# Patient Record
Sex: FEMALE | Race: WHITE | NOT HISPANIC OR LATINO | Employment: OTHER | URBAN - NONMETROPOLITAN AREA
[De-identification: names, ages, dates, MRNs, and addresses within clinical notes are randomized per-mention and may not be internally consistent; named-entity substitution may affect disease eponyms.]

---

## 2019-01-07 ENCOUNTER — OFFICE VISIT (OUTPATIENT)
Dept: ORTHOPEDIC SURGERY | Facility: CLINIC | Age: 64
End: 2019-01-07

## 2019-01-07 VITALS — WEIGHT: 153 LBS | BODY MASS INDEX: 27.11 KG/M2 | HEIGHT: 63 IN | TEMPERATURE: 97.8 F

## 2019-01-07 DIAGNOSIS — S42.211A CLOSED DISPLACED FRACTURE OF SURGICAL NECK OF RIGHT HUMERUS, UNSPECIFIED FRACTURE MORPHOLOGY, INITIAL ENCOUNTER: Primary | ICD-10-CM

## 2019-01-07 PROCEDURE — 99203 OFFICE O/P NEW LOW 30 MIN: CPT | Performed by: ORTHOPAEDIC SURGERY

## 2019-01-07 RX ORDER — FERROUS SULFATE 325(65) MG
TABLET ORAL
Refills: 0 | COMMUNITY
Start: 2018-10-01

## 2019-01-07 RX ORDER — OXYCODONE HYDROCHLORIDE AND ACETAMINOPHEN 5; 325 MG/1; MG/1
1 TABLET ORAL EVERY 6 HOURS PRN
Qty: 40 TABLET | Refills: 0 | Status: SHIPPED | OUTPATIENT
Start: 2019-01-07 | End: 2019-01-11 | Stop reason: HOSPADM

## 2019-01-07 RX ORDER — PANTOPRAZOLE SODIUM 40 MG/1
40 TABLET, DELAYED RELEASE ORAL DAILY
Refills: 5 | Status: ON HOLD | COMMUNITY
Start: 2018-12-18 | End: 2022-12-07

## 2019-01-07 RX ORDER — LISINOPRIL 10 MG/1
10 TABLET ORAL DAILY
Refills: 5 | COMMUNITY
Start: 2018-12-18

## 2019-01-07 RX ORDER — HYDROCODONE BITARTRATE AND ACETAMINOPHEN 5; 325 MG/1; MG/1
TABLET ORAL
Refills: 0 | Status: ON HOLD | COMMUNITY
Start: 2019-01-05 | End: 2019-01-10

## 2019-01-07 NOTE — PROGRESS NOTES
Chief Complaint   Patient presents with   • Right Shoulder - Pain       HPI  63 year old female presents with a right shoulder injury following a fall sustained on 01/05/19.  She was seen at [facility] and x-rays were obtained.  And fell when she was walking down a ramp.  She did not notice that it was icy and landed awkwardly on her right shoulder.  She had immediate pain with swelling and deformity.  She has abnormal mobility at the fracture site.  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  She was seen in the emergency room and diagnosed with a proximal humerus fracture.  Patient was placed in a sling and sent to our office for further management.  She has stayed neurovascularly intact since the time of the injury.  She is thinking about surgical versus nonsurgical care of this proximal humerus fracture.  She would like to get back to work ASAP.  She states that the fracture fragments are moving at the site of the injury.  She has been applying heat to this area which is somewhat helpful for managing her symptoms.          Allergies   Allergen Reactions   • Morphine Nausea And Vomiting         Social History     Socioeconomic History   • Marital status:      Spouse name: Not on file   • Number of children: Not on file   • Years of education: Not on file   • Highest education level: Not on file   Social Needs   • Financial resource strain: Not on file   • Food insecurity - worry: Not on file   • Food insecurity - inability: Not on file   • Transportation needs - medical: Not on file   • Transportation needs - non-medical: Not on file   Occupational History   • Not on file   Tobacco Use   • Smoking status: Never Smoker   Substance and Sexual Activity   • Alcohol use: No     Frequency: Never   • Drug use: Not on file   • Sexual activity: Not on file   Other Topics Concern   • Not on file   Social History Narrative   • Not on file       History reviewed. No pertinent family history.    No past  surgical history on file.    History reviewed. No pertinent past medical history.        Vitals:    01/07/19 1307   Temp: 97.8 °F (36.6 °C)             Review of Systems   Constitutional: Negative.    HENT: Negative.    Eyes: Negative.    Respiratory: Negative.    Cardiovascular: Negative.    Gastrointestinal: Negative.    Endocrine: Negative.    Genitourinary: Negative.    Musculoskeletal: Positive for joint swelling.   Skin: Negative.    Allergic/Immunologic: Negative.    Neurological: Negative.    Hematological: Negative.    Psychiatric/Behavioral: Negative.            Physical Exam   Constitutional: She is oriented to person, place, and time. She appears well-nourished.   HENT:   Head: Atraumatic.   Eyes: EOM are normal.   Neck: Neck supple.   Cardiovascular: Normal heart sounds and intact distal pulses.   Pulmonary/Chest: Breath sounds normal.   Abdominal: Bowel sounds are normal.   Musculoskeletal: She exhibits edema and tenderness.   Neurological: She is alert and oriented to person, place, and time.   Skin: Skin is warm. Capillary refill takes 2 to 3 seconds.   Psychiatric: She has a normal mood and affect. Her behavior is normal. Judgment and thought content normal.   Nursing note and vitals reviewed.              Joint/Body Part Specific Exam:right shoulder. The patient has extreme amounts of tenderness over the greater tuberosity of the humerus. There is some tenderness over the lesser tuberosity as well. External rotation is extremely limited and painful. Rotator cuff function is inhibited because of pain and tenderness at the insertion of the rotator cuff on the greater tuberosity of the humerus. Axillary nerve function is well preserved. Biceps function is intact. Radial artey pulses are palpable. Apprehension sign is negative. Both active and passive ranges of motion are extremely limited and painful for the patient. There is no clinical deformity. There does appear to be some abnormal mobility at the  fracture site. The pain level is 6.            X-RAY Report:  X-rays obtained from the emergency room are available today in the office.  There is a comminuted proximal humerus fracture involving the metaphysis.  The capital fragment appears to be intact.  There are comminuted butterfly fragments present medially and laterally as well.  The decision for surgical intervention is based on the findings of these images and correlation with patient's clinical examination.          Diagnostics:            Drea was seen today for pain.    Diagnoses and all orders for this visit:    Closed displaced fracture of surgical neck of right humerus, unspecified fracture morphology, initial encounter    Other orders  -     oxyCODONE-acetaminophen (PERCOCET) 5-325 MG per tablet; Take 1 tablet by mouth Every 6 (Six) Hours As Needed (prn for pain).            Procedures          I provided this patient with educational materials regarding bone health and cast or splint care.        Plan: Application of a supportive Jacobs cast brace to maintain the alignment of the fracture fragments    Tablet Percocet 5/325 milligrams tab 1 by mouth every 6 for pain swelling and discomfort.    Tablet ibuprofen 600 mg orally twice a day for pain and discomfort.    Use a sling on the upper extremity.    This is a proximal diaphyseal humerus fracture and is most likely going to need open reduction internal fixation.    I will discuss this with my partner Dr. WOODSON who does a lot more of these fractures than I do and refer the patient to Dr. WOODSON for ORIF in the next few days.    Risks and benefits of surgical intervention discussed with the patient and her son at length and she understands and accepts that.    She is going to think about surgical versus nonsurgical care and then proceed with making that decision after she sees Dr. WOODSON.    Controlled substance treatment options discussed in detail. Patient's signed consent to medical options on file.  BLANCA form in chart.  The patient is being provided this narcotic prescription to address the acute medical condition that they are undergoing/experiencing at this time.  It is my medical and surgical assessment that more than 3 days of narcotic medication is necessary to help control the pain and discomfort that this patient is experiencing at this point.  Risks of narcotic medication usage outlined.  Possibility of physical and psychological dependence and abuse, especially long term, emphasized to the patient.  I have explained to the patient that we will try to wean them off the narcotic medication as soon as possible and introduce non-narcotic modalities of pain control.  At this point in the patient's clinical spectrum, however, alternative available treatments are inadequate to control their pain and symptoms.  I have also discussed the possibility of random drug testing as well as pill counts to prevent misuse and misappropriation of the narcotic medications prescribed to the patient.          CC To Lilia Turner APRN

## 2019-01-08 ENCOUNTER — TELEPHONE (OUTPATIENT)
Dept: ORTHOPEDIC SURGERY | Facility: CLINIC | Age: 64
End: 2019-01-08

## 2019-01-08 PROBLEM — S42.211A CLOSED DISPLACED FRACTURE OF SURGICAL NECK OF RIGHT HUMERUS: Status: ACTIVE | Noted: 2019-01-08

## 2019-01-08 NOTE — TELEPHONE ENCOUNTER
PATIENT CALLED IN TO CHECK ON THE STATUS OF SCHEDULING HER SURGERY. I CHECKED WITH DR. KEARNEY AND HE SAID THAT HE AND DR IVAN HAVE TALKED AND THAT DR IVAN IS GOING TO DO THIS CASE AND ASKED THAT I FORWARD HER TO YOU. THANKS SO MUCH

## 2019-01-09 ENCOUNTER — PREP FOR SURGERY (OUTPATIENT)
Dept: OTHER | Facility: HOSPITAL | Age: 64
End: 2019-01-09

## 2019-01-09 ENCOUNTER — TELEPHONE (OUTPATIENT)
Dept: ORTHOPEDIC SURGERY | Facility: CLINIC | Age: 64
End: 2019-01-09

## 2019-01-09 DIAGNOSIS — S42.209A CLOSED FRACTURE OF PROXIMAL END OF HUMERUS, UNSPECIFIED FRACTURE MORPHOLOGY, UNSPECIFIED LATERALITY, INITIAL ENCOUNTER: Primary | ICD-10-CM

## 2019-01-09 RX ORDER — CEFAZOLIN SODIUM 2 G/100ML
2 INJECTION, SOLUTION INTRAVENOUS ONCE
Status: CANCELLED | OUTPATIENT
Start: 2019-01-10 | End: 2019-01-09

## 2019-01-09 NOTE — TELEPHONE ENCOUNTER
I have entered a case request for her.  Please schedule her for next week as we discussed.  Please ask her if she would like to meet with me preoperatively in the office.  I'm happy to see her next Monday or Wednesday to discuss.  If she feels comfortable, we can discuss things when she shows up for her surgery as well.

## 2019-01-09 NOTE — TELEPHONE ENCOUNTER
I spoke to patient re: surgery and she was not happy with the date of 1/17. She said it is very swollen and she is sleeping sitting up in a chair. I have added her in on Friday to see you.      JULIO C

## 2019-01-09 NOTE — TELEPHONE ENCOUNTER
Is there any way to add her onto my schedule for tomorrow?  Since I only have 5, we could easily add her on...

## 2019-01-10 ENCOUNTER — APPOINTMENT (OUTPATIENT)
Dept: GENERAL RADIOLOGY | Facility: HOSPITAL | Age: 64
End: 2019-01-10

## 2019-01-10 ENCOUNTER — PREP FOR SURGERY (OUTPATIENT)
Dept: OTHER | Facility: HOSPITAL | Age: 64
End: 2019-01-10

## 2019-01-10 ENCOUNTER — HOSPITAL ENCOUNTER (OUTPATIENT)
Facility: HOSPITAL | Age: 64
Discharge: HOME | End: 2019-01-11
Attending: ORTHOPAEDIC SURGERY | Admitting: ORTHOPAEDIC SURGERY

## 2019-01-10 ENCOUNTER — ANESTHESIA (OUTPATIENT)
Dept: PERIOP | Facility: HOSPITAL | Age: 64
End: 2019-01-10

## 2019-01-10 ENCOUNTER — ANESTHESIA EVENT (OUTPATIENT)
Dept: PERIOP | Facility: HOSPITAL | Age: 64
End: 2019-01-10

## 2019-01-10 DIAGNOSIS — S42.209A CLOSED FRACTURE OF PROXIMAL END OF HUMERUS, UNSPECIFIED FRACTURE MORPHOLOGY, UNSPECIFIED LATERALITY, INITIAL ENCOUNTER: ICD-10-CM

## 2019-01-10 LAB
ABO GROUP BLD: NORMAL
ANION GAP SERPL CALCULATED.3IONS-SCNC: 9.2 MMOL/L
BASOPHILS # BLD AUTO: 0.02 10*3/MM3 (ref 0–0.2)
BASOPHILS NFR BLD AUTO: 0.4 % (ref 0–1.5)
BILIRUB UR QL STRIP: NEGATIVE
BLD GP AB SCN SERPL QL: NEGATIVE
BUN BLD-MCNC: 10 MG/DL (ref 8–23)
BUN/CREAT SERPL: 13.3 (ref 7–25)
CALCIUM SPEC-SCNC: 9.5 MG/DL (ref 8.6–10.5)
CHLORIDE SERPL-SCNC: 99 MMOL/L (ref 98–107)
CLARITY UR: CLEAR
CO2 SERPL-SCNC: 28.8 MMOL/L (ref 22–29)
COLOR UR: YELLOW
CREAT BLD-MCNC: 0.75 MG/DL (ref 0.57–1)
DEPRECATED RDW RBC AUTO: 40.3 FL (ref 37–54)
EOSINOPHIL # BLD AUTO: 0.06 10*3/MM3 (ref 0–0.7)
EOSINOPHIL NFR BLD AUTO: 1.3 % (ref 0.3–6.2)
ERYTHROCYTE [DISTWIDTH] IN BLOOD BY AUTOMATED COUNT: 12.2 % (ref 11.7–13)
GFR SERPL CREATININE-BSD FRML MDRD: 79 ML/MIN/1.73
GLUCOSE BLD-MCNC: 119 MG/DL (ref 65–99)
GLUCOSE UR STRIP-MCNC: NEGATIVE MG/DL
HCT VFR BLD AUTO: 23.9 % (ref 35.6–45.5)
HCT VFR BLD AUTO: 25.2 % (ref 35.6–45.5)
HGB BLD-MCNC: 7.9 G/DL (ref 11.9–15.5)
HGB BLD-MCNC: 8.4 G/DL (ref 11.9–15.5)
HGB UR QL STRIP.AUTO: NEGATIVE
IMM GRANULOCYTES # BLD AUTO: 0 10*3/MM3 (ref 0–0.03)
IMM GRANULOCYTES NFR BLD AUTO: 0 % (ref 0–0.5)
KETONES UR QL STRIP: NEGATIVE
LEUKOCYTE ESTERASE UR QL STRIP.AUTO: NEGATIVE
LYMPHOCYTES # BLD AUTO: 1.09 10*3/MM3 (ref 0.9–4.8)
LYMPHOCYTES NFR BLD AUTO: 24.5 % (ref 19.6–45.3)
MCH RBC QN AUTO: 30.1 PG (ref 26.9–32)
MCHC RBC AUTO-ENTMCNC: 33.3 G/DL (ref 32.4–36.3)
MCV RBC AUTO: 90.3 FL (ref 80.5–98.2)
MONOCYTES # BLD AUTO: 0.54 10*3/MM3 (ref 0.2–1.2)
MONOCYTES NFR BLD AUTO: 12.1 % (ref 5–12)
NEUTROPHILS # BLD AUTO: 2.74 10*3/MM3 (ref 1.9–8.1)
NEUTROPHILS NFR BLD AUTO: 61.7 % (ref 42.7–76)
NITRITE UR QL STRIP: NEGATIVE
PH UR STRIP.AUTO: 7 [PH] (ref 5–8)
PLATELET # BLD AUTO: 244 10*3/MM3 (ref 140–500)
PMV BLD AUTO: 9.3 FL (ref 6–12)
POTASSIUM BLD-SCNC: 4.4 MMOL/L (ref 3.5–5.2)
PROT UR QL STRIP: NEGATIVE
RBC # BLD AUTO: 2.79 10*6/MM3 (ref 3.9–5.2)
RH BLD: POSITIVE
SODIUM BLD-SCNC: 137 MMOL/L (ref 136–145)
SP GR UR STRIP: 1.01 (ref 1–1.03)
T&S EXPIRATION DATE: NORMAL
UROBILINOGEN UR QL STRIP: NORMAL
WBC NRBC COR # BLD: 4.45 10*3/MM3 (ref 4.5–10.7)

## 2019-01-10 PROCEDURE — 25010000002 ONDANSETRON PER 1 MG: Performed by: ANESTHESIOLOGY

## 2019-01-10 PROCEDURE — C1713 ANCHOR/SCREW BN/BN,TIS/BN: HCPCS | Performed by: ORTHOPAEDIC SURGERY

## 2019-01-10 PROCEDURE — 93005 ELECTROCARDIOGRAM TRACING: CPT | Performed by: ORTHOPAEDIC SURGERY

## 2019-01-10 PROCEDURE — 86900 BLOOD TYPING SEROLOGIC ABO: CPT

## 2019-01-10 PROCEDURE — 25010000002 ROPIVACAINE PER 1 MG: Performed by: ANESTHESIOLOGY

## 2019-01-10 PROCEDURE — G0378 HOSPITAL OBSERVATION PER HR: HCPCS

## 2019-01-10 PROCEDURE — 86923 COMPATIBILITY TEST ELECTRIC: CPT

## 2019-01-10 PROCEDURE — 25010000002 PROPOFOL 10 MG/ML EMULSION: Performed by: ANESTHESIOLOGY

## 2019-01-10 PROCEDURE — 93010 ELECTROCARDIOGRAM REPORT: CPT | Performed by: INTERNAL MEDICINE

## 2019-01-10 PROCEDURE — 25010000002 MIDAZOLAM PER 1 MG: Performed by: ANESTHESIOLOGY

## 2019-01-10 PROCEDURE — 86901 BLOOD TYPING SEROLOGIC RH(D): CPT | Performed by: ORTHOPAEDIC SURGERY

## 2019-01-10 PROCEDURE — 73060 X-RAY EXAM OF HUMERUS: CPT

## 2019-01-10 PROCEDURE — 23410 REPAIR ROTATOR CUFF ACUTE: CPT | Performed by: ORTHOPAEDIC SURGERY

## 2019-01-10 PROCEDURE — 85018 HEMOGLOBIN: CPT | Performed by: ORTHOPAEDIC SURGERY

## 2019-01-10 PROCEDURE — 86850 RBC ANTIBODY SCREEN: CPT | Performed by: ORTHOPAEDIC SURGERY

## 2019-01-10 PROCEDURE — 25010000003 CEFAZOLIN IN DEXTROSE 2-4 GM/100ML-% SOLUTION: Performed by: ORTHOPAEDIC SURGERY

## 2019-01-10 PROCEDURE — 25010000002 FENTANYL CITRATE (PF) 100 MCG/2ML SOLUTION: Performed by: ANESTHESIOLOGY

## 2019-01-10 PROCEDURE — 85014 HEMATOCRIT: CPT | Performed by: ORTHOPAEDIC SURGERY

## 2019-01-10 PROCEDURE — 86901 BLOOD TYPING SEROLOGIC RH(D): CPT

## 2019-01-10 PROCEDURE — 85025 COMPLETE CBC W/AUTO DIFF WBC: CPT | Performed by: ORTHOPAEDIC SURGERY

## 2019-01-10 PROCEDURE — 23615 OPTX PROX HUMRL FX W/INT FIX: CPT | Performed by: ORTHOPAEDIC SURGERY

## 2019-01-10 PROCEDURE — 76000 FLUOROSCOPY <1 HR PHYS/QHP: CPT

## 2019-01-10 PROCEDURE — 25010000002 DEXAMETHASONE PER 1 MG: Performed by: ANESTHESIOLOGY

## 2019-01-10 PROCEDURE — 80048 BASIC METABOLIC PNL TOTAL CA: CPT | Performed by: ORTHOPAEDIC SURGERY

## 2019-01-10 PROCEDURE — 86900 BLOOD TYPING SEROLOGIC ABO: CPT | Performed by: ORTHOPAEDIC SURGERY

## 2019-01-10 PROCEDURE — 81003 URINALYSIS AUTO W/O SCOPE: CPT | Performed by: ORTHOPAEDIC SURGERY

## 2019-01-10 PROCEDURE — 36430 TRANSFUSION BLD/BLD COMPNT: CPT

## 2019-01-10 PROCEDURE — P9016 RBC LEUKOCYTES REDUCED: HCPCS

## 2019-01-10 DEVICE — SCRW LK S/TAP STRDRV 3.5X48MM: Type: IMPLANTABLE DEVICE | Status: FUNCTIONAL

## 2019-01-10 DEVICE — SCRW LK S/TAP STRDRV 3.5X24MM: Type: IMPLANTABLE DEVICE | Status: FUNCTIONAL

## 2019-01-10 DEVICE — SCRW LK S/TAP STRDRV 3.5X46MM: Type: IMPLANTABLE DEVICE | Status: FUNCTIONAL

## 2019-01-10 DEVICE — PLT HUM LCP PROX STD SS 3H 3.5X90MM: Type: IMPLANTABLE DEVICE | Status: FUNCTIONAL

## 2019-01-10 DEVICE — SCRW LK S/TAP STRDRV 3.5X36MM: Type: IMPLANTABLE DEVICE | Status: FUNCTIONAL

## 2019-01-10 DEVICE — SCRW LK S/TAP STRDRV 3.5X42MM: Type: IMPLANTABLE DEVICE | Status: FUNCTIONAL

## 2019-01-10 DEVICE — SCRW LK S/TAP STRDRV 3.5X26MM: Type: IMPLANTABLE DEVICE | Status: FUNCTIONAL

## 2019-01-10 DEVICE — SCRW CORT S/TAP 3.5X30MM: Type: IMPLANTABLE DEVICE | Status: FUNCTIONAL

## 2019-01-10 DEVICE — SCRW LK S/TAP STRDRV 3.5X40MM: Type: IMPLANTABLE DEVICE | Status: FUNCTIONAL

## 2019-01-10 DEVICE — GII QUICKANCHOR PLUS SIZE 2 (5 METRIC) PANACRYL BRAIDED ABSORBABLE SUTURE 36 INCHES (91CM), DOUBLE ARMED WITH CP-2 NEEDLES, WITH DISPOSABLE INSERTER.
Type: IMPLANTABLE DEVICE | Status: FUNCTIONAL
Brand: GII QUICKANCHOR PANACRYL

## 2019-01-10 DEVICE — SCRW LK S/TAP STRDRV 3.5X38MM: Type: IMPLANTABLE DEVICE | Status: FUNCTIONAL

## 2019-01-10 RX ORDER — FENTANYL CITRATE 50 UG/ML
50 INJECTION, SOLUTION INTRAMUSCULAR; INTRAVENOUS
Status: DISCONTINUED | OUTPATIENT
Start: 2019-01-10 | End: 2019-01-10 | Stop reason: HOSPADM

## 2019-01-10 RX ORDER — PROMETHAZINE HYDROCHLORIDE 25 MG/1
25 SUPPOSITORY RECTAL ONCE AS NEEDED
Status: DISCONTINUED | OUTPATIENT
Start: 2019-01-10 | End: 2019-01-10 | Stop reason: HOSPADM

## 2019-01-10 RX ORDER — ACETAMINOPHEN 325 MG/1
650 TABLET ORAL EVERY 4 HOURS PRN
Status: DISCONTINUED | OUTPATIENT
Start: 2019-01-10 | End: 2019-01-11 | Stop reason: HOSPADM

## 2019-01-10 RX ORDER — MIDAZOLAM HYDROCHLORIDE 1 MG/ML
1 INJECTION INTRAMUSCULAR; INTRAVENOUS
Status: DISCONTINUED | OUTPATIENT
Start: 2019-01-10 | End: 2019-01-10 | Stop reason: HOSPADM

## 2019-01-10 RX ORDER — ONDANSETRON 2 MG/ML
4 INJECTION INTRAMUSCULAR; INTRAVENOUS ONCE AS NEEDED
Status: DISCONTINUED | OUTPATIENT
Start: 2019-01-10 | End: 2019-01-10 | Stop reason: HOSPADM

## 2019-01-10 RX ORDER — LIDOCAINE HYDROCHLORIDE 20 MG/ML
INJECTION, SOLUTION INFILTRATION; PERINEURAL AS NEEDED
Status: DISCONTINUED | OUTPATIENT
Start: 2019-01-10 | End: 2019-01-10 | Stop reason: SURG

## 2019-01-10 RX ORDER — LIDOCAINE HYDROCHLORIDE 10 MG/ML
0.5 INJECTION, SOLUTION EPIDURAL; INFILTRATION; INTRACAUDAL; PERINEURAL ONCE AS NEEDED
Status: DISCONTINUED | OUTPATIENT
Start: 2019-01-10 | End: 2019-01-10 | Stop reason: HOSPADM

## 2019-01-10 RX ORDER — HYDRALAZINE HYDROCHLORIDE 20 MG/ML
5 INJECTION INTRAMUSCULAR; INTRAVENOUS
Status: DISCONTINUED | OUTPATIENT
Start: 2019-01-10 | End: 2019-01-10 | Stop reason: HOSPADM

## 2019-01-10 RX ORDER — OXYCODONE AND ACETAMINOPHEN 7.5; 325 MG/1; MG/1
1 TABLET ORAL EVERY 4 HOURS PRN
Status: DISCONTINUED | OUTPATIENT
Start: 2019-01-10 | End: 2019-01-11 | Stop reason: HOSPADM

## 2019-01-10 RX ORDER — DIPHENHYDRAMINE HYDROCHLORIDE 50 MG/ML
12.5 INJECTION INTRAMUSCULAR; INTRAVENOUS
Status: DISCONTINUED | OUTPATIENT
Start: 2019-01-10 | End: 2019-01-10 | Stop reason: HOSPADM

## 2019-01-10 RX ORDER — HYDROCODONE BITARTRATE AND ACETAMINOPHEN 7.5; 325 MG/1; MG/1
1 TABLET ORAL ONCE AS NEEDED
Status: DISCONTINUED | OUTPATIENT
Start: 2019-01-10 | End: 2019-01-10 | Stop reason: HOSPADM

## 2019-01-10 RX ORDER — MIDAZOLAM HYDROCHLORIDE 1 MG/ML
2 INJECTION INTRAMUSCULAR; INTRAVENOUS
Status: DISCONTINUED | OUTPATIENT
Start: 2019-01-10 | End: 2019-01-10 | Stop reason: HOSPADM

## 2019-01-10 RX ORDER — PROMETHAZINE HYDROCHLORIDE 25 MG/1
25 TABLET ORAL ONCE AS NEEDED
Status: DISCONTINUED | OUTPATIENT
Start: 2019-01-10 | End: 2019-01-10 | Stop reason: HOSPADM

## 2019-01-10 RX ORDER — ONDANSETRON 4 MG/1
4 TABLET, FILM COATED ORAL EVERY 6 HOURS PRN
Status: DISCONTINUED | OUTPATIENT
Start: 2019-01-10 | End: 2019-01-11 | Stop reason: HOSPADM

## 2019-01-10 RX ORDER — DEXAMETHASONE SODIUM PHOSPHATE 10 MG/ML
INJECTION INTRAMUSCULAR; INTRAVENOUS AS NEEDED
Status: DISCONTINUED | OUTPATIENT
Start: 2019-01-10 | End: 2019-01-10 | Stop reason: SURG

## 2019-01-10 RX ORDER — ACETAMINOPHEN 325 MG/1
650 TABLET ORAL ONCE AS NEEDED
Status: DISCONTINUED | OUTPATIENT
Start: 2019-01-10 | End: 2019-01-10 | Stop reason: HOSPADM

## 2019-01-10 RX ORDER — NALOXONE HCL 0.4 MG/ML
0.1 VIAL (ML) INJECTION
Status: DISCONTINUED | OUTPATIENT
Start: 2019-01-10 | End: 2019-01-11 | Stop reason: HOSPADM

## 2019-01-10 RX ORDER — FAMOTIDINE 10 MG/ML
20 INJECTION, SOLUTION INTRAVENOUS ONCE
Status: COMPLETED | OUTPATIENT
Start: 2019-01-10 | End: 2019-01-10

## 2019-01-10 RX ORDER — FERROUS SULFATE 325(65) MG
325 TABLET ORAL
Status: DISCONTINUED | OUTPATIENT
Start: 2019-01-11 | End: 2019-01-11 | Stop reason: HOSPADM

## 2019-01-10 RX ORDER — ROPIVACAINE HYDROCHLORIDE 5 MG/ML
INJECTION, SOLUTION EPIDURAL; INFILTRATION; PERINEURAL
Status: COMPLETED | OUTPATIENT
Start: 2019-01-10 | End: 2019-01-10

## 2019-01-10 RX ORDER — HYDROMORPHONE HYDROCHLORIDE 1 MG/ML
0.5 INJECTION, SOLUTION INTRAMUSCULAR; INTRAVENOUS; SUBCUTANEOUS
Status: DISCONTINUED | OUTPATIENT
Start: 2019-01-10 | End: 2019-01-10 | Stop reason: HOSPADM

## 2019-01-10 RX ORDER — DOCUSATE SODIUM 100 MG/1
100 CAPSULE, LIQUID FILLED ORAL 2 TIMES DAILY
Status: DISCONTINUED | OUTPATIENT
Start: 2019-01-10 | End: 2019-01-11 | Stop reason: HOSPADM

## 2019-01-10 RX ORDER — SODIUM CHLORIDE, SODIUM LACTATE, POTASSIUM CHLORIDE, CALCIUM CHLORIDE 600; 310; 30; 20 MG/100ML; MG/100ML; MG/100ML; MG/100ML
100 INJECTION, SOLUTION INTRAVENOUS CONTINUOUS
Status: DISCONTINUED | OUTPATIENT
Start: 2019-01-10 | End: 2019-01-11 | Stop reason: HOSPADM

## 2019-01-10 RX ORDER — ONDANSETRON 4 MG/1
4 TABLET, ORALLY DISINTEGRATING ORAL EVERY 6 HOURS PRN
Status: DISCONTINUED | OUTPATIENT
Start: 2019-01-10 | End: 2019-01-11 | Stop reason: HOSPADM

## 2019-01-10 RX ORDER — PROMETHAZINE HYDROCHLORIDE 25 MG/ML
12.5 INJECTION, SOLUTION INTRAMUSCULAR; INTRAVENOUS ONCE AS NEEDED
Status: DISCONTINUED | OUTPATIENT
Start: 2019-01-10 | End: 2019-01-10 | Stop reason: HOSPADM

## 2019-01-10 RX ORDER — ONDANSETRON 2 MG/ML
INJECTION INTRAMUSCULAR; INTRAVENOUS AS NEEDED
Status: DISCONTINUED | OUTPATIENT
Start: 2019-01-10 | End: 2019-01-10 | Stop reason: SURG

## 2019-01-10 RX ORDER — SODIUM CHLORIDE, SODIUM LACTATE, POTASSIUM CHLORIDE, CALCIUM CHLORIDE 600; 310; 30; 20 MG/100ML; MG/100ML; MG/100ML; MG/100ML
9 INJECTION, SOLUTION INTRAVENOUS CONTINUOUS
Status: DISCONTINUED | OUTPATIENT
Start: 2019-01-10 | End: 2019-01-10 | Stop reason: HOSPADM

## 2019-01-10 RX ORDER — CEFAZOLIN SODIUM 2 G/100ML
2 INJECTION, SOLUTION INTRAVENOUS ONCE
Status: COMPLETED | OUTPATIENT
Start: 2019-01-10 | End: 2019-01-10

## 2019-01-10 RX ORDER — OXYCODONE AND ACETAMINOPHEN 7.5; 325 MG/1; MG/1
2 TABLET ORAL EVERY 4 HOURS PRN
Status: DISCONTINUED | OUTPATIENT
Start: 2019-01-10 | End: 2019-01-11 | Stop reason: HOSPADM

## 2019-01-10 RX ORDER — LABETALOL HYDROCHLORIDE 5 MG/ML
5 INJECTION, SOLUTION INTRAVENOUS
Status: DISCONTINUED | OUTPATIENT
Start: 2019-01-10 | End: 2019-01-10 | Stop reason: HOSPADM

## 2019-01-10 RX ORDER — MAGNESIUM HYDROXIDE 1200 MG/15ML
LIQUID ORAL AS NEEDED
Status: DISCONTINUED | OUTPATIENT
Start: 2019-01-10 | End: 2019-01-10 | Stop reason: HOSPADM

## 2019-01-10 RX ORDER — SODIUM CHLORIDE 0.9 % (FLUSH) 0.9 %
1-10 SYRINGE (ML) INJECTION AS NEEDED
Status: DISCONTINUED | OUTPATIENT
Start: 2019-01-10 | End: 2019-01-10 | Stop reason: HOSPADM

## 2019-01-10 RX ORDER — DIPHENHYDRAMINE HCL 25 MG
25 CAPSULE ORAL
Status: DISCONTINUED | OUTPATIENT
Start: 2019-01-10 | End: 2019-01-10 | Stop reason: HOSPADM

## 2019-01-10 RX ORDER — BISACODYL 10 MG
10 SUPPOSITORY, RECTAL RECTAL DAILY PRN
Status: DISCONTINUED | OUTPATIENT
Start: 2019-01-10 | End: 2019-01-11 | Stop reason: HOSPADM

## 2019-01-10 RX ORDER — ONDANSETRON 2 MG/ML
4 INJECTION INTRAMUSCULAR; INTRAVENOUS EVERY 6 HOURS PRN
Status: DISCONTINUED | OUTPATIENT
Start: 2019-01-10 | End: 2019-01-11 | Stop reason: HOSPADM

## 2019-01-10 RX ORDER — FLUMAZENIL 0.1 MG/ML
0.2 INJECTION INTRAVENOUS AS NEEDED
Status: DISCONTINUED | OUTPATIENT
Start: 2019-01-10 | End: 2019-01-10 | Stop reason: HOSPADM

## 2019-01-10 RX ORDER — NALOXONE HCL 0.4 MG/ML
0.2 VIAL (ML) INJECTION AS NEEDED
Status: DISCONTINUED | OUTPATIENT
Start: 2019-01-10 | End: 2019-01-10 | Stop reason: HOSPADM

## 2019-01-10 RX ORDER — PANTOPRAZOLE SODIUM 40 MG/1
40 TABLET, DELAYED RELEASE ORAL DAILY
Status: DISCONTINUED | OUTPATIENT
Start: 2019-01-10 | End: 2019-01-11 | Stop reason: HOSPADM

## 2019-01-10 RX ORDER — HYDROMORPHONE HYDROCHLORIDE 1 MG/ML
0.5 INJECTION, SOLUTION INTRAMUSCULAR; INTRAVENOUS; SUBCUTANEOUS
Status: DISCONTINUED | OUTPATIENT
Start: 2019-01-10 | End: 2019-01-11 | Stop reason: HOSPADM

## 2019-01-10 RX ORDER — PROPOFOL 10 MG/ML
VIAL (ML) INTRAVENOUS AS NEEDED
Status: DISCONTINUED | OUTPATIENT
Start: 2019-01-10 | End: 2019-01-10 | Stop reason: SURG

## 2019-01-10 RX ORDER — OXYCODONE AND ACETAMINOPHEN 7.5; 325 MG/1; MG/1
1 TABLET ORAL ONCE AS NEEDED
Status: COMPLETED | OUTPATIENT
Start: 2019-01-10 | End: 2019-01-10

## 2019-01-10 RX ORDER — CEFAZOLIN SODIUM 2 G/100ML
2 INJECTION, SOLUTION INTRAVENOUS EVERY 8 HOURS
Status: COMPLETED | OUTPATIENT
Start: 2019-01-10 | End: 2019-01-11

## 2019-01-10 RX ORDER — EPHEDRINE SULFATE 50 MG/ML
5 INJECTION, SOLUTION INTRAVENOUS ONCE AS NEEDED
Status: DISCONTINUED | OUTPATIENT
Start: 2019-01-10 | End: 2019-01-10 | Stop reason: HOSPADM

## 2019-01-10 RX ORDER — GLYCOPYRROLATE 0.2 MG/ML
INJECTION INTRAMUSCULAR; INTRAVENOUS AS NEEDED
Status: DISCONTINUED | OUTPATIENT
Start: 2019-01-10 | End: 2019-01-10 | Stop reason: SURG

## 2019-01-10 RX ADMIN — FAMOTIDINE 20 MG: 10 INJECTION, SOLUTION INTRAVENOUS at 13:52

## 2019-01-10 RX ADMIN — SODIUM CHLORIDE, POTASSIUM CHLORIDE, SODIUM LACTATE AND CALCIUM CHLORIDE 100 ML/HR: 600; 310; 30; 20 INJECTION, SOLUTION INTRAVENOUS at 20:57

## 2019-01-10 RX ADMIN — ROPIVACAINE HYDROCHLORIDE 25 ML: 5 INJECTION, SOLUTION EPIDURAL; INFILTRATION; PERINEURAL at 14:43

## 2019-01-10 RX ADMIN — GLYCOPYRROLATE 0.1 MG: 0.2 INJECTION INTRAMUSCULAR; INTRAVENOUS at 14:50

## 2019-01-10 RX ADMIN — DOCUSATE SODIUM 100 MG: 100 CAPSULE, LIQUID FILLED ORAL at 20:06

## 2019-01-10 RX ADMIN — ONDANSETRON 4 MG: 2 INJECTION INTRAMUSCULAR; INTRAVENOUS at 14:50

## 2019-01-10 RX ADMIN — DEXAMETHASONE SODIUM PHOSPHATE 8 MG: 10 INJECTION INTRAMUSCULAR; INTRAVENOUS at 14:50

## 2019-01-10 RX ADMIN — SODIUM CHLORIDE, POTASSIUM CHLORIDE, SODIUM LACTATE AND CALCIUM CHLORIDE 9 ML/HR: 600; 310; 30; 20 INJECTION, SOLUTION INTRAVENOUS at 13:52

## 2019-01-10 RX ADMIN — PROPOFOL 100 MG: 10 INJECTION, EMULSION INTRAVENOUS at 14:50

## 2019-01-10 RX ADMIN — OXYCODONE HYDROCHLORIDE AND ACETAMINOPHEN 1 TABLET: 7.5; 325 TABLET ORAL at 22:57

## 2019-01-10 RX ADMIN — CEFAZOLIN SODIUM 2 G: 2 INJECTION, SOLUTION INTRAVENOUS at 22:57

## 2019-01-10 RX ADMIN — MIDAZOLAM HYDROCHLORIDE 2 MG: 2 INJECTION, SOLUTION INTRAMUSCULAR; INTRAVENOUS at 13:52

## 2019-01-10 RX ADMIN — MIDAZOLAM HYDROCHLORIDE 1 MG: 2 INJECTION, SOLUTION INTRAMUSCULAR; INTRAVENOUS at 14:30

## 2019-01-10 RX ADMIN — FENTANYL CITRATE 50 MCG: 50 INJECTION, SOLUTION INTRAMUSCULAR; INTRAVENOUS at 14:30

## 2019-01-10 RX ADMIN — LIDOCAINE HYDROCHLORIDE 60 MG: 20 INJECTION, SOLUTION INFILTRATION; PERINEURAL at 14:50

## 2019-01-10 RX ADMIN — CEFAZOLIN SODIUM 2 G: 2 INJECTION, SOLUTION INTRAVENOUS at 15:08

## 2019-01-10 RX ADMIN — OXYCODONE HYDROCHLORIDE AND ACETAMINOPHEN 1 TABLET: 7.5; 325 TABLET ORAL at 18:34

## 2019-01-10 NOTE — ANESTHESIA PROCEDURE NOTES
Peripheral Block      Patient reassessed immediately prior to procedure    Patient location during procedure: holding area  Start time: 1/10/2019 2:28 PM  Stop time: 1/10/2019 2:36 PM  Reason for block: at surgeon's request and post-op pain management  Performed by  Anesthesiologist: Demetris Ford MD  Preanesthetic Checklist  Completed: patient identified, site marked, surgical consent, pre-op evaluation, timeout performed, IV checked, risks and benefits discussed and monitors and equipment checked  Prep:  Pt Position: supine  Sterile barriers:cap, gloves and mask  Prep: ChloraPrep  Patient monitoring: blood pressure monitoring, continuous pulse oximetry and EKG  Procedure  Sedation:yes  Performed under: PNB  Guidance:ultrasound guided  ULTRASOUND INTERPRETATION.  Using ultrasound guidance a 21 G gauge needle was placed in close proximity to the brachial plexus nerve, at which point, under ultrasound guidance anesthetic was injected in the area of the nerve and spread of the anesthesia was seen on ultrasound in close proximity thereto.  There were no abnormalities seen on ultrasound; a digital image was taken; and the patient tolerated the procedure with no complications. Images:still images obtained    Laterality:right  Block Type:interscalene  Injection Technique:single-shotNeedle Gauge:21 G    Medications Used: ropivacaine (NAROPIN) 0.5 % injection, 25 mL  Post Assessment  Injection Assessment: negative aspiration for heme, no paresthesia on injection and incremental injection  Patient Tolerance:comfortable throughout block  Complications:no

## 2019-01-10 NOTE — OP NOTE
Orthopaedic Operative Note    Facility: [facility]    Patient: Drea Hill    Medical Record Number: [id number]    YOB: 1955    Dictating Surgeon: Albert Rosales M.D.*    Primary Care Physician: Lilia Turner APRN    Date of Operation: 1/10/2019    Pre-Operative Diagnosis:  Right proximal humerus fracture    Post-Operative Diagnosis:  Right proximal humerus fracture with associated full-thickness rotator cuff tear    Procedure Performed:  1.  Open reduction, internal fixation of displaced proximal humerus fracture 2.  Open rotator cuff repair    Surgeon: Albert Rosales MD     Assistant: DARIN Valdes    Anesthesia: Regional followed by Gen.    Complications: None.     Estimated Blood Loss: Less than 50 mL.     Implants: Synthes 3 hole proximal humerus locking plate with multiple locking and nonlocking screws, multiple #2 Max braid sutures for open rotator cuff repair    Specimens:   Order Name Source Comment Collection Info Order Time   BASIC METABOLIC PANEL   Collected By: Vicky Bravo RN 1/10/2019  1:04 PM   TYPE AND SCREEN   Collected By: Vicky Bravo RN 1/10/2019  2:05 PM   PREPARE RBC Other   1/10/2019  2:05 PM    Indication for Transfusion  Other - Comment Required       Other indication  PRE-OP FOR SURGERY       When to Transfuse?  Hold          Brief Operative Indication:  Mrs. Hill sustained a right proximal humerus fracture in a fall.  The risks, benefits, and alternatives to surgical fixation of the fracture were discussed in detail.  We talked about the surgery itself, how it is done, and the rehabilitation and recovery.  Specifically, with regards to the risks, we talked about the risk of infection, wound healing problems, nonunion, malunion, persistent pain or deformity which could necessitate further intervention down the road including the possible need for revision to an arthroplasty.  We talked about injury to nearby neurovascular structures,  particularly the axillary or musculocutaneous nerves, which could result in permanent weakness, numbness, or dysfunction and potentially necessitate further surgery as well.  Last,we did also talk about the risk of RSD, PE, DVT, anesthesia related complications and medical complications as result of surgery potentially resulting in death.  The patient has consented to proceed.    Description of the procedure in detail:  The patient and operative site were identified in the preoperative holding area.  The surgical site was marked.  Adequate regional anesthesia was administered.  The patient was then taken to the operating room.  The patient was placed on the operating table where adequate general anesthesia was administered.  The patient was then repositioned into the modified beachchair position with the head and neck in neutral alignment.  All bony prominences were carefully padded and protected.    The right upper extremity was then prepped and draped in the standard, sterile fashion.  The extremity was cleaned with an alcohol solution.  A Hibiclens scrub was performed and then the extremity was prepped with 2 ChloraPrep preps.  I allowed this to dry for 3 minutes before the draping procedure was carried out.    A timeout was taken and preoperative antibiotics administered.  Transexamic acid was administered at this time as well.  Following this, I began by fashioning an approximately 6 cm incision over the anterior aspect of the shoulder.  This was carried down through the skin and subcutaneous tissues.  Full-thickness medial and lateral skin flaps were developed.  The interval between the deltoid and pectoralis was carefully identified and developed.  The underlying cephalic vein was dissected out and retracted laterally.  This structure was kept protected throughout the case.    A large fracture hematoma was encountered.  This was removed and debrided.  The fracture was comminuted.  This was basically a  two-part fracture through the upper metaphysis.  The head had fallen down into varus, retroversion and had translated posteriorly.  There was also a full-thickness supraspinatus tendon tear which was minimally retracted.  The tissue appeared to be of good quality and this did appear to be a repairable lesion.  Multiple Max braid sutures were placed at the bone tendon junction of the proximal segment and intact rotator cuff for control of this.  This was used to reduce the main fragments back into good position.    Once I had the main fragments sufficiently well reduced, an appropriate length plate was selected for use and then pinned to the lateral aspect of the tuberosity, just posterior to the biceps groove.  I made sure that the plate was in good position and then checked this fluoroscopically.  The plate was then secured with a single nonlocking screw which was drilled, measured and placed through the oblong hole on the shaft.  This screw got good purchase and seemed to hold the plate in good position.    Next, I directed my attention to the proximal fixation.  Multiple proximal locking screws were placed under direct fluoroscopic guidance.  A receding pin sign was performed on each screw as it was placed to make sure that there was no intra-articular penetration.  During drilling of the screws I sounded with the drill and took care not to penetrate the articular surface.  I confirmed this with a depth gauge before placing each screw as well.  All the locking screws locked into the plate well.  Once I completed the proximal fixation, several additional locking screws were placed on the shaft to secure the construct.  Final images were taken and saved.  It did appear that had a good reduction of the fracture and that the hardware was well-positioned.  The sutures used to control the head segment and tuberosity were then tied to the plate using 6 throw surgeon's knots.  I then debrided the tuberosity below the  torn portion of the rotator cuff to create a healing response.  Several inverted mattress Max braid sutures were passed through the torn tissue.  These were then tunneled transosseously through the tuberosity and tied to the plate.  This afforded a good repair of the rotator cuff without any excessive tension.    I could fully elevate, abduct and rotate the shoulder without any mechanical phenomenon or limitation.  The shoulder demonstrated excellent motion and absolutely no instability.  At this point, I irrigated the wound with 500 cc of a Betadine-containing saline solution.  I then irrigated with 3 L of sterile saline mixed with bacitracin via pulsatile lavage.  I made sure that we had good hemostasis.  A 10 Setswana Hemovac drain was placed.  The wound was sequentially closed in a layered fashion.  Vicryl was used to repair the subcutaneous tissues.  A running subcuticular Monocryl stitch was used to close the skin followed by Dermabond.  Sterile dressings were applied.  The drapes were withdrawn.  The arm was placed in a sling.  The patient was awakened and taken to the recovery room in good condition.    Albert Rosales MD  01/10/19

## 2019-01-10 NOTE — H&P (VIEW-ONLY)
I met with the patient in the preoperative holding area.  She had previously seen my partner have been referred to me.  She had requested that we go ahead and set up the surgery as an outpatient.  I had offered to meet her in the office to discuss this but she declined that offer.      History is as noted in the medical record.  I reviewed that information.  We discussed her x-rays and options.  Given the displaced nature of the fracture, surgical fixation is reasonable for her.  The risks, benefits, and alternatives to surgical fixation of the fracture were discussed in detail.  We talked about the surgery itself, how it is done, and the rehabilitation and recovery.  Specifically, with regards to the risks, we talked about the risk of infection, wound healing problems, nonunion, malunion, persistent pain or deformity which could necessitate further intervention down the road.  We talked about risk of injury to nearby neurovascular structures which could result in permanent weakness, numbness, or dysfunction and potentially necessitate further surgery as well.  Last,we did also talk about the risk of RSD, PE, DVT, anesthesia related complications and medical complications as result of surgery potentially resulting in death.  The patient has consented to proceed.    Albert Rosales MD

## 2019-01-10 NOTE — ANESTHESIA PROCEDURE NOTES
ANESTHESIA INTUBATION  Airway not difficult    General Information and Staff    Anesthesiologist: Jose Armando Carranza MD    Indications and Patient Condition    Preoxygenated: yes      Final Airway Details  Final airway type: supraglottic airway      Successful airway: unique  Size 4

## 2019-01-10 NOTE — ANESTHESIA POSTPROCEDURE EVALUATION
Patient: Drea Hill    Procedure Summary     Date:  01/10/19 Room / Location:  [facility] OR  / [facility] MAIN OR    Anesthesia Start:  1442 Anesthesia Stop:  1626    Procedure:  Open Reduction and Internal Fixation Proximal Humerus (Right Arm Upper) Diagnosis:       Closed fracture of proximal end of humerus, unspecified fracture morphology, unspecified laterality, initial encounter      (Closed fracture of proximal end of humerus, unspecified fracture morphology, unspecified laterality, initial encounter [S42.209A])    Surgeon:  Albert Rosales MD Provider:  Jose Armando Carranza MD    Anesthesia Type:  general ASA Status:  2          Anesthesia Type: general  Last vitals  BP   133/68 (01/10/19 1730)   Temp   36.7 °C (98 °F) (01/10/19 1730)   Pulse   71 (01/10/19 1730)   Resp   16 (01/10/19 1730)     SpO2   100 % (01/10/19 1730)     Post Anesthesia Care and Evaluation    Patient location during evaluation: PACU  Patient participation: complete - patient participated  Level of consciousness: awake  Pain management: adequate  Airway patency: patent  Anesthetic complications: No anesthetic complications    Cardiovascular status: acceptable  Respiratory status: acceptable  Hydration status: acceptable

## 2019-01-10 NOTE — BRIEF OP NOTE
HUMERUS PROXIMAL OPEN REDUCTION INTERNAL FIXATION  Progress Note    Drea Hill  1/10/2019    Pre-op Diagnosis:   Closed fracture of proximal end of humerus, unspecified fracture morphology, unspecified laterality, initial encounter [S42.209A]       Post-Op Diagnosis Codes:     * Closed fracture of proximal end of humerus, unspecified fracture morphology, unspecified laterality, initial encounter [S42.209A]    Procedure/CPT® Codes:      Procedure(s):  Open Reduction and Internal Fixation Proximal Humerus, open rotator cuff repair    Surgeon(s):  Albert Rosales MD    Anesthesia: General with Block    Staff:   Circulator: Belen Estrada RN  Radiology Technologist: Cash Montelongo RRT  Scrub Person: Cyndy Barahona  Vendor Representative: Debbie Luu George  Assistant: Jese Priest CSA    Estimated Blood Loss: minimal    Urine Voided: * No values recorded between 1/10/2019  2:42 PM and 1/10/2019  4:07 PM *    Specimens:                None      Drains:      Findings: see dictatoin    Complications: none      Albert Rosales MD     Date: 1/10/2019  Time: 4:07 PM

## 2019-01-10 NOTE — ANESTHESIA PREPROCEDURE EVALUATION
Anesthesia Evaluation     history of anesthetic complications: PONV  NPO Solid Status: > 8 hours  NPO Liquid Status: > 2 hours           Airway   Mallampati: I  Dental    (+) upper dentures    Pulmonary - normal exam   Cardiovascular - normal exam    ECG reviewed        Neuro/Psych  GI/Hepatic/Renal/Endo      Musculoskeletal     Abdominal    Substance History      OB/GYN          Other                      Anesthesia Plan    ASA 2     general   (Interscalene block for post op)  intravenous induction   Anesthetic plan, all risks, benefits, and alternatives have been provided, discussed and informed consent has been obtained with: patient.

## 2019-01-11 VITALS
SYSTOLIC BLOOD PRESSURE: 127 MMHG | DIASTOLIC BLOOD PRESSURE: 71 MMHG | RESPIRATION RATE: 18 BRPM | TEMPERATURE: 97.9 F | WEIGHT: 150 LBS | BODY MASS INDEX: 25.61 KG/M2 | HEART RATE: 64 BPM | OXYGEN SATURATION: 95 % | HEIGHT: 64 IN

## 2019-01-11 LAB
ABO + RH BLD: NORMAL
BH BB BLOOD EXPIRATION DATE: NORMAL
BH BB BLOOD TYPE BARCODE: 6200
BH BB DISPENSE STATUS: NORMAL
BH BB PRODUCT CODE: NORMAL
BH BB UNIT NUMBER: NORMAL
HCT VFR BLD AUTO: 27.3 % (ref 35.6–45.5)
HGB BLD-MCNC: 8.7 G/DL (ref 11.9–15.5)
UNIT  ABO: NORMAL
UNIT  RH: NORMAL

## 2019-01-11 PROCEDURE — 85018 HEMOGLOBIN: CPT | Performed by: ORTHOPAEDIC SURGERY

## 2019-01-11 PROCEDURE — 99024 POSTOP FOLLOW-UP VISIT: CPT | Performed by: NURSE PRACTITIONER

## 2019-01-11 PROCEDURE — 36415 COLL VENOUS BLD VENIPUNCTURE: CPT | Performed by: ORTHOPAEDIC SURGERY

## 2019-01-11 PROCEDURE — 25010000003 CEFAZOLIN IN DEXTROSE 2-4 GM/100ML-% SOLUTION: Performed by: ORTHOPAEDIC SURGERY

## 2019-01-11 PROCEDURE — 85014 HEMATOCRIT: CPT | Performed by: ORTHOPAEDIC SURGERY

## 2019-01-11 RX ORDER — OXYCODONE AND ACETAMINOPHEN 7.5; 325 MG/1; MG/1
TABLET ORAL
Qty: 42 TABLET | Refills: 0 | Status: CANCELLED
Start: 2019-01-11

## 2019-01-11 RX ORDER — PSEUDOEPHEDRINE HCL 30 MG
100 TABLET ORAL 2 TIMES DAILY
Start: 2019-01-11

## 2019-01-11 RX ORDER — ONDANSETRON 4 MG/1
4 TABLET, FILM COATED ORAL EVERY 6 HOURS PRN
Qty: 24 TABLET | Refills: 0 | Status: SHIPPED | OUTPATIENT
Start: 2019-01-11 | End: 2019-03-22

## 2019-01-11 RX ORDER — OXYCODONE AND ACETAMINOPHEN 7.5; 325 MG/1; MG/1
TABLET ORAL
Qty: 42 TABLET | Refills: 0
Start: 2019-01-11 | End: 2019-02-25

## 2019-01-11 RX ORDER — OXYCODONE AND ACETAMINOPHEN 7.5; 325 MG/1; MG/1
1-2 TABLET ORAL EVERY 4 HOURS PRN
Qty: 42 TABLET | Refills: 0 | Status: SHIPPED | OUTPATIENT
Start: 2019-01-11 | End: 2019-02-25

## 2019-01-11 RX ADMIN — OXYCODONE HYDROCHLORIDE AND ACETAMINOPHEN 2 TABLET: 7.5; 325 TABLET ORAL at 03:14

## 2019-01-11 RX ADMIN — FERROUS SULFATE TAB 325 MG (65 MG ELEMENTAL FE) 325 MG: 325 (65 FE) TAB at 08:30

## 2019-01-11 RX ADMIN — OXYCODONE HYDROCHLORIDE AND ACETAMINOPHEN 2 TABLET: 7.5; 325 TABLET ORAL at 06:51

## 2019-01-11 RX ADMIN — PANTOPRAZOLE SODIUM 40 MG: 40 TABLET, DELAYED RELEASE ORAL at 08:29

## 2019-01-11 RX ADMIN — CEFAZOLIN SODIUM 2 G: 2 INJECTION, SOLUTION INTRAVENOUS at 06:24

## 2019-01-11 RX ADMIN — POLYETHYLENE GLYCOL 3350 17 G: 17 POWDER, FOR SOLUTION ORAL at 08:30

## 2019-01-11 RX ADMIN — OXYCODONE HYDROCHLORIDE AND ACETAMINOPHEN 2 TABLET: 7.5; 325 TABLET ORAL at 10:40

## 2019-01-11 RX ADMIN — DOCUSATE SODIUM 100 MG: 100 CAPSULE, LIQUID FILLED ORAL at 08:30

## 2019-01-11 NOTE — PLAN OF CARE
Problem: Patient Care Overview  Goal: Plan of Care Review  Outcome: Ongoing (interventions implemented as appropriate)   01/11/19 0544   Coping/Psychosocial   Plan of Care Reviewed With patient   Plan of Care Review   Progress improving   OTHER   Outcome Summary VSS, voiding BSC. po pain meds effective, drain, home today     Goal: Individualization and Mutuality  Outcome: Ongoing (interventions implemented as appropriate)    Goal: Discharge Needs Assessment  Outcome: Ongoing (interventions implemented as appropriate)      Problem: Pain, Acute (Adult)  Goal: Identify Related Risk Factors and Signs and Symptoms  Outcome: Ongoing (interventions implemented as appropriate)    Goal: Acceptable Pain Control/Comfort Level  Outcome: Ongoing (interventions implemented as appropriate)      Problem: Fall Risk (Adult)  Goal: Identify Related Risk Factors and Signs and Symptoms  Outcome: Ongoing (interventions implemented as appropriate)    Goal: Absence of Fall  Outcome: Ongoing (interventions implemented as appropriate)

## 2019-01-11 NOTE — DISCHARGE SUMMARY
Date of Admission:  1/10/2019    Date of Discharge:  1/11/2019    Discharge Diagnosis: s/p open reduction internal fixation right displaced proximal humerus fracture, open right rotator cuff repair    Admitting Physician: Albert Rosales    Consults: none    DETAILS OF HOSPITAL STAY:  Patient is a 63 y.o. female was admitted to the floor following an open reduction internal fixation right displaced proximal humerus fracture, open right rotator cuff repair.  Post-operatively the patient was transferred to the post-operative floor where the patient underwent physical therapy including both active and passive ROM exercises. Opioids were titrated to achieve appropriate pain management to allow for participation in mobilization exercises. Vital signs are now stable. The incision is benign without signs or symptoms of infection. Operative extremity neurovascular status remains intact. Appropriate education re: incision care, activity levels, medications, and follow up visits was completed and all questions were answered. The patient is now deemed stable for discharge to home.    Condition on Discharge:  Stable    Discharge Medications     Discharge Medications      New Medications      Instructions Start Date   docusate sodium 100 MG capsule   100 mg, Oral, 2 Times Daily      ondansetron 4 MG tablet  Commonly known as:  ZOFRAN   4 mg, Oral, Every 6 Hours PRN      oxyCODONE-acetaminophen 7.5-325 MG per tablet  Commonly known as:  PERCOCET  Replaces:  oxyCODONE-acetaminophen 5-325 MG per tablet   Take 1-2 tabs po q 4 hours prn pain         Continue These Medications      Instructions Start Date   FEROSUL 325 (65 FE) MG tablet  Generic drug:  ferrous sulfate   TK 1 T PO D WITH BREAKFAST ON AN EMPTY STOMACH WITH SOMETHING ACIDIC      lisinopril 10 MG tablet  Commonly known as:  PRINIVIL,ZESTRIL   Oral, Daily      pantoprazole 40 MG EC tablet  Commonly known as:  PROTONIX   40 mg, Oral, Daily         Stop These Medications     oxyCODONE-acetaminophen 5-325 MG per tablet  Commonly known as:  PERCOCET  Replaced by:  oxyCODONE-acetaminophen 7.5-325 MG per tablet          Discharge Diet: regular    Activity at Discharge: as tolerated    Discharge Instructions:   1)  Continue to follow precautions as instructed.   2)  Patient is instructed to continue use of the sling except when performing their physical therapy exercising and while dressing or showering.  3)  Continue to ice regularly. Patient also instructed on incentive spirometer during hospitalization and encouraged to continue to use at home regularly.   4)  The dressing should be left in place. If waterproof dressing is intact the patient may shower immediately following discharge. If the dressing becomes disloged or saturated it should be changed and patient must wait until POD #5 to shower. If dressing is changed, apply dry sterile dressing after showering.  5)  Follow up appointment in 2 weeks - patient to call the office at [phone] to schedule.     Follow-up Appointments  2 weeks with Dr Bobby Stauffer, APRN  01/11/19  5:08 PM

## 2019-01-25 ENCOUNTER — OFFICE VISIT (OUTPATIENT)
Dept: ORTHOPEDIC SURGERY | Facility: CLINIC | Age: 64
End: 2019-01-25

## 2019-01-25 VITALS — HEIGHT: 64 IN | BODY MASS INDEX: 25.61 KG/M2 | TEMPERATURE: 98.2 F | WEIGHT: 150 LBS

## 2019-01-25 DIAGNOSIS — Z09 SURGERY FOLLOW-UP: Primary | ICD-10-CM

## 2019-01-25 PROCEDURE — 99024 POSTOP FOLLOW-UP VISIT: CPT | Performed by: ORTHOPAEDIC SURGERY

## 2019-01-25 PROCEDURE — 73030 X-RAY EXAM OF SHOULDER: CPT | Performed by: ORTHOPAEDIC SURGERY

## 2019-01-25 RX ORDER — HYDROCODONE BITARTRATE AND ACETAMINOPHEN 5; 325 MG/1; MG/1
1 TABLET ORAL EVERY 4 HOURS PRN
Qty: 50 TABLET | Refills: 0 | Status: SHIPPED | OUTPATIENT
Start: 2019-01-25 | End: 2019-02-25 | Stop reason: SDUPTHER

## 2019-01-25 NOTE — PROGRESS NOTES
Drea Hill     : 1955     MRN: [id number]     DATE: 2019    CC:  2 weeks s/p ORIF right proximal humerus fracture    HPI:  Pt. returns to clinic today stating pain is improved.  Motion is progressing.  Denies any new concerns or issues.    Vitals:    19 0920   Temp: 98.2 °F (36.8 °C)       Exam:  Incision is well-approximated.  No erythema or drainage.  Contour of shoulder appears normal.  Skin intact and benign.  Arm and forearm soft.  Shoulder moves fluidly with pendulum exercises.  Good motor and sensory function distally.  Palpable pulses with good cap refill.      Imaginv xrays including AP and scapular Y are ordered and reviewed by me to evaluate alignment and for comparison purposes.  No new or concerning findings noted. Fracture appears well-aligned.  Hardware appears in good position.    Impression:   2 weeks s/p ORIF right proximal humerus fracture    Plan:    1.  Discontinue use of sling.  2.  Can begin working on progressive ROM.  3.  F/u in 4 weeks with repeat 3v xrays.  4.  Counseled the patient about appropriate activity modifications and restrictions.    Albert Rosales MD    2019

## 2019-02-25 ENCOUNTER — OFFICE VISIT (OUTPATIENT)
Dept: ORTHOPEDIC SURGERY | Facility: CLINIC | Age: 64
End: 2019-02-25

## 2019-02-25 VITALS — WEIGHT: 145.2 LBS | BODY MASS INDEX: 24.79 KG/M2 | HEIGHT: 64 IN | TEMPERATURE: 98.6 F

## 2019-02-25 DIAGNOSIS — Z87.81 S/P ORIF (OPEN REDUCTION INTERNAL FIXATION) FRACTURE: Primary | ICD-10-CM

## 2019-02-25 DIAGNOSIS — Z98.890 S/P ORIF (OPEN REDUCTION INTERNAL FIXATION) FRACTURE: Primary | ICD-10-CM

## 2019-02-25 PROCEDURE — 73030 X-RAY EXAM OF SHOULDER: CPT | Performed by: NURSE PRACTITIONER

## 2019-02-25 PROCEDURE — 99024 POSTOP FOLLOW-UP VISIT: CPT | Performed by: NURSE PRACTITIONER

## 2019-02-25 RX ORDER — HYDROCODONE BITARTRATE AND ACETAMINOPHEN 5; 325 MG/1; MG/1
1 TABLET ORAL EVERY 4 HOURS PRN
Qty: 50 TABLET | Refills: 0 | Status: ON HOLD | OUTPATIENT
Start: 2019-02-25 | End: 2022-12-07

## 2019-02-25 NOTE — PROGRESS NOTES
Drea Hill : 1955 MRN: [id number] DATE: 2019    CC:  ORIF right proximal humerus fracture    HPI: Pt. returns to clinic today stating pain is improved.  Motion is progressing.  Denies any new concerns or issues.  PT is helping.    Vitals:    19 0803   Temp: 98.6 °F (37 °C)       Current Outpatient Medications:   •  docusate sodium 100 MG capsule, Take 100 mg by mouth 2 (Two) Times a Day., Disp: , Rfl:   •  FEROSUL 325 (65 Fe) MG tablet, TK 1 T PO D WITH BREAKFAST ON AN EMPTY STOMACH WITH SOMETHING ACIDIC, Disp: , Rfl: 0  •  HYDROcodone-acetaminophen (NORCO) 5-325 MG per tablet, Take 1 tablet by mouth Every 4 (Four) Hours As Needed for Moderate Pain ., Disp: 50 tablet, Rfl: 0  •  lisinopril (PRINIVIL,ZESTRIL) 10 MG tablet, Take  by mouth Daily., Disp: , Rfl: 5  •  ondansetron (ZOFRAN) 4 MG tablet, Take 1 tablet by mouth Every 6 (Six) Hours As Needed for Nausea or Vomiting., Disp: 24 tablet, Rfl: 0  •  oxyCODONE-acetaminophen (PERCOCET) 7.5-325 MG per tablet, Take 1-2 tabs po q 4 hours prn pain, Disp: 42 tablet, Rfl: 0  •  oxyCODONE-acetaminophen (PERCOCET) 7.5-325 MG per tablet, Take 1-2 tablets by mouth Every 4 (Four) Hours As Needed for Moderate Pain ., Disp: 42 tablet, Rfl: 0  •  pantoprazole (PROTONIX) 40 MG EC tablet, Take 40 mg by mouth Daily., Disp: , Rfl: 5    Past Medical History:   Diagnosis Date   • Anemia    • Arthritis     osteoarthritis lt knee   • Fracture, humerus 2019   • GERD (gastroesophageal reflux disease)    • Hypertension        Past Surgical History:   Procedure Laterality Date   • APPENDECTOMY     • CHOLECYSTECTOMY     • MOUTH SURGERY      benign mouth tumors x 2   • ORIF HUMERUS FRACTURE Right 1/10/2019    Procedure: Open Reduction and Internal Fixation Proximal Humerus;  Surgeon: Albert Rosales MD;  Location: [facility];  Service: Orthopedics       History reviewed. No pertinent family history.    Social History     Socioeconomic History   • Marital status:       Spouse name: Not on file   • Number of children: Not on file   • Years of education: Not on file   • Highest education level: Not on file   Social Needs   • Financial resource strain: Not on file   • Food insecurity - worry: Not on file   • Food insecurity - inability: Not on file   • Transportation needs - medical: Not on file   • Transportation needs - non-medical: Not on file   Occupational History   • Not on file   Tobacco Use   • Smoking status: Never Smoker   • Smokeless tobacco: Never Used   Substance and Sexual Activity   • Alcohol use: No     Frequency: Never   • Drug use: No   • Sexual activity: Not on file   Other Topics Concern   • Not on file   Social History Narrative   • Not on file     Exam:   Contour of shoulder appears normal.  Skin intact and benign.  Arm and forearm soft.  Motion is: Forward elevation 110°, external rotation 60°, internal rotation to T11.  Good motor and sensory function distally.  Palpable pulses with good cap refill.      Imaging   2v xrays including AP and scapular Y and axillary are ordered and reviewed by me to evaluate alignment and for comparison purposes.  No new or concerning findings noted. There has been interval callous formation.    Impression:  6 weeks s/p ORIF right proximal humerus fracture    Plan:    1.  Progress ROM and strengthening as tolerated.  2.  Continue PT.  3.  F/u in 4 weeks with repeat 2v xrays  4.  I have agreed to refill her hydrocodone.  The risks were discussed.    Sofiya Stauffer, APRN    02/25/2019

## 2019-03-22 ENCOUNTER — OFFICE VISIT (OUTPATIENT)
Dept: ORTHOPEDIC SURGERY | Facility: CLINIC | Age: 64
End: 2019-03-22

## 2019-03-22 VITALS — TEMPERATURE: 98.1 F | BODY MASS INDEX: 24.75 KG/M2 | WEIGHT: 145 LBS | HEIGHT: 64 IN

## 2019-03-22 DIAGNOSIS — Z98.890 S/P ORIF (OPEN REDUCTION INTERNAL FIXATION) FRACTURE: Primary | ICD-10-CM

## 2019-03-22 DIAGNOSIS — Z87.81 S/P ORIF (OPEN REDUCTION INTERNAL FIXATION) FRACTURE: Primary | ICD-10-CM

## 2019-03-22 PROCEDURE — 99024 POSTOP FOLLOW-UP VISIT: CPT | Performed by: NURSE PRACTITIONER

## 2019-03-22 PROCEDURE — 73030 X-RAY EXAM OF SHOULDER: CPT | Performed by: NURSE PRACTITIONER

## 2019-03-22 RX ORDER — CYCLOBENZAPRINE HCL 5 MG
5 TABLET ORAL 3 TIMES DAILY PRN
Qty: 30 TABLET | Refills: 0 | Status: ON HOLD | OUTPATIENT
Start: 2019-03-22 | End: 2022-12-07

## 2019-03-22 NOTE — PROGRESS NOTES
Drea Hill : 1955 MRN: [id number] DATE: 3/22/2019    CC: 2.5 months s/p ORIF right proximal humerus fracture    HPI: Pt. returns to clinic today stating pain is minimal.  Motion is steadily improving.  Reports intermittent muscle spasms in left shoulder.    Vitals:    19 1553   Temp: 98.1 °F (36.7 °C)       Current Outpatient Medications:   •  docusate sodium 100 MG capsule, Take 100 mg by mouth 2 (Two) Times a Day., Disp: , Rfl:   •  FEROSUL 325 (65 Fe) MG tablet, TK 1 T PO D WITH BREAKFAST ON AN EMPTY STOMACH WITH SOMETHING ACIDIC, Disp: , Rfl: 0  •  HYDROcodone-acetaminophen (NORCO) 5-325 MG per tablet, Take 1 tablet by mouth Every 4 (Four) Hours As Needed for Moderate Pain ., Disp: 50 tablet, Rfl: 0  •  lisinopril (PRINIVIL,ZESTRIL) 10 MG tablet, Take  by mouth Daily., Disp: , Rfl: 5  •  pantoprazole (PROTONIX) 40 MG EC tablet, Take 40 mg by mouth Daily., Disp: , Rfl: 5    Past Medical History:   Diagnosis Date   • Anemia    • Arthritis     osteoarthritis lt knee   • Fracture, humerus 2019   • GERD (gastroesophageal reflux disease)    • Hypertension        Past Surgical History:   Procedure Laterality Date   • APPENDECTOMY     • CHOLECYSTECTOMY     • MOUTH SURGERY      benign mouth tumors x 2   • ORIF HUMERUS FRACTURE Right 1/10/2019    Procedure: Open Reduction and Internal Fixation Proximal Humerus;  Surgeon: Albert Rosales MD;  Location: [facility];  Service: Orthopedics       History reviewed. No pertinent family history.    Social History     Socioeconomic History   • Marital status:      Spouse name: Not on file   • Number of children: Not on file   • Years of education: Not on file   • Highest education level: Not on file   Tobacco Use   • Smoking status: Never Smoker   • Smokeless tobacco: Never Used   Substance and Sexual Activity   • Alcohol use: No     Frequency: Never   • Drug use: No      Exam:   Incision is healed.  Arm and forearm soft.  Motion is to 140° forward  elevation, 60° external rotation, internal rotation to T10.  Good motor and sensory function distally.  Palpable pulses with good cap refill.      Imaging   3v xrays including AP, scapular Y and axillary are ordered and reviewed by me to evaluate alignment and for comparison purposes.  No new or concerning findings noted. Fracture appears well-aligned.  Hardware appears in good position.  There has been significant callus formation.    Impression:   2.5 months s/p ORIF right proximal humerus fracture    Plan:  1.  Continue working on ROM.  2.  We discussed activity restrictions and modifications for the next 2 weeks.  3.  I have agreed to give her Flexeril for muscles spasms.  The risks were discussed.  4.  May return to work April 8, 2019.  5.  Follow up in 2 months.    CANDACE Armenta    03/22/2019

## 2019-05-24 ENCOUNTER — OFFICE VISIT (OUTPATIENT)
Dept: ORTHOPEDIC SURGERY | Facility: CLINIC | Age: 64
End: 2019-05-24

## 2019-05-24 VITALS — BODY MASS INDEX: 24.75 KG/M2 | HEIGHT: 64 IN | TEMPERATURE: 98 F | WEIGHT: 145 LBS

## 2019-05-24 DIAGNOSIS — Z87.81 S/P ORIF (OPEN REDUCTION INTERNAL FIXATION) FRACTURE: Primary | ICD-10-CM

## 2019-05-24 DIAGNOSIS — Z98.890 S/P ORIF (OPEN REDUCTION INTERNAL FIXATION) FRACTURE: Primary | ICD-10-CM

## 2019-05-24 PROCEDURE — 73030 X-RAY EXAM OF SHOULDER: CPT | Performed by: ORTHOPAEDIC SURGERY

## 2019-05-24 PROCEDURE — 99024 POSTOP FOLLOW-UP VISIT: CPT | Performed by: ORTHOPAEDIC SURGERY

## 2019-05-24 RX ORDER — MULTIVIT-MIN/IRON/FOLIC ACID/K 18-600-40
1 CAPSULE ORAL DAILY
COMMUNITY
Start: 2022-12-11

## 2019-05-24 NOTE — PROGRESS NOTES
Drea Hill : 1955 MRN: [id number] DATE: 2019    CC: 4 months s/p ORIF right proximal humerus fracture    HPI: Pt. returns to clinic today stating pain is minimal.  She has some soreness after work.  Her job involves quite a bit of lifting, pushing and pulling.  She is happy with her outcome.  She reports being able to do most activities that she could do before the injury.  She is doing therapy on her own at this point.    Vitals:    19 0756   Temp: 98 °F (36.7 °C)         Current Outpatient Medications:   •  Cholecalciferol (VITAMIN D) 2000 units capsule, Take 1 capsule by mouth., Disp: , Rfl:   •  FEROSUL 325 (65 Fe) MG tablet, TK 1 T PO D WITH BREAKFAST ON AN EMPTY STOMACH WITH SOMETHING ACIDIC, Disp: , Rfl: 0  •  lisinopril (PRINIVIL,ZESTRIL) 10 MG tablet, Take  by mouth Daily., Disp: , Rfl: 5  •  pantoprazole (PROTONIX) 40 MG EC tablet, Take 40 mg by mouth Daily., Disp: , Rfl: 5  •  cyclobenzaprine (FLEXERIL) 5 MG tablet, Take 1 tablet by mouth 3 (Three) Times a Day As Needed for Muscle Spasms., Disp: 30 tablet, Rfl: 0  •  docusate sodium 100 MG capsule, Take 100 mg by mouth 2 (Two) Times a Day., Disp: , Rfl:   •  HYDROcodone-acetaminophen (NORCO) 5-325 MG per tablet, Take 1 tablet by mouth Every 4 (Four) Hours As Needed for Moderate Pain ., Disp: 50 tablet, Rfl: 0    Past Medical History:   Diagnosis Date   • Anemia    • Arthritis     osteoarthritis lt knee   • Fracture, humerus 2019   • GERD (gastroesophageal reflux disease)    • Hypertension        Past Surgical History:   Procedure Laterality Date   • APPENDECTOMY     • CHOLECYSTECTOMY     • MOUTH SURGERY      benign mouth tumors x 2   • ORIF HUMERUS FRACTURE Right 1/10/2019    Procedure: Open Reduction and Internal Fixation Proximal Humerus;  Surgeon: Albert Rosales MD;  Location: [facility];  Service: Orthopedics       History reviewed. No pertinent family history.    Social History     Socioeconomic History   • Marital  status:      Spouse name: Not on file   • Number of children: Not on file   • Years of education: Not on file   • Highest education level: Not on file   Tobacco Use   • Smoking status: Never Smoker   • Smokeless tobacco: Never Used   Substance and Sexual Activity   • Alcohol use: No     Frequency: Never   • Drug use: No       Exam:   Incision is healed.  Arm and forearm soft.  Motion: 160 degrees forward elevation, 50 degrees external rotation, T10 internal rotation.  Good motor and sensory function distally.  Palpable pulse with good cap refill.      Imaging   2v xrays including AP, scapular Y are ordered and reviewed by me to evaluate alignment and for comparison purposes.  No new or concerning findings noted. Fracture appears well-aligned.  Hardware appears in good position.  There has been significant callous formation.    Impression:   4 months s/p ORIF right proximal humerus fracture    Plan:  1.  Continue working on ROM.  2.  No restrictions necessary at this point.  3.  Will release to follow up as needed.

## 2022-12-06 ENCOUNTER — APPOINTMENT (OUTPATIENT)
Dept: GENERAL RADIOLOGY | Facility: HOSPITAL | Age: 67
End: 2022-12-06

## 2022-12-06 ENCOUNTER — HOSPITAL ENCOUNTER (INPATIENT)
Facility: HOSPITAL | Age: 67
LOS: 3 days | Discharge: HOME HEALTH | End: 2022-12-09
Attending: EMERGENCY MEDICINE | Admitting: INTERNAL MEDICINE

## 2022-12-06 DIAGNOSIS — S72.001A CLOSED FRACTURE OF RIGHT HIP, INITIAL ENCOUNTER: Primary | ICD-10-CM

## 2022-12-06 DIAGNOSIS — Z98.890 S/P ORIF (OPEN REDUCTION INTERNAL FIXATION) FRACTURE: ICD-10-CM

## 2022-12-06 DIAGNOSIS — R53.1 GENERALIZED WEAKNESS: ICD-10-CM

## 2022-12-06 DIAGNOSIS — Z87.81 S/P ORIF (OPEN REDUCTION INTERNAL FIXATION) FRACTURE: ICD-10-CM

## 2022-12-06 LAB
ALBUMIN SERPL-MCNC: 4.5 G/DL (ref 3.5–5.2)
ALBUMIN/GLOB SERPL: 1.7 G/DL
ALP SERPL-CCNC: 78 U/L (ref 39–117)
ALT SERPL W P-5'-P-CCNC: 11 U/L (ref 1–33)
ANION GAP SERPL CALCULATED.3IONS-SCNC: 11.2 MMOL/L (ref 5–15)
AST SERPL-CCNC: 14 U/L (ref 1–32)
BASOPHILS # BLD AUTO: 0.04 10*3/MM3 (ref 0–0.2)
BASOPHILS NFR BLD AUTO: 0.4 % (ref 0–1.5)
BILIRUB SERPL-MCNC: 0.3 MG/DL (ref 0–1.2)
BUN SERPL-MCNC: 12 MG/DL (ref 8–23)
BUN/CREAT SERPL: 14.8 (ref 7–25)
CALCIUM SPEC-SCNC: 9.2 MG/DL (ref 8.6–10.5)
CHLORIDE SERPL-SCNC: 101 MMOL/L (ref 98–107)
CO2 SERPL-SCNC: 23.8 MMOL/L (ref 22–29)
CREAT SERPL-MCNC: 0.81 MG/DL (ref 0.57–1)
DEPRECATED RDW RBC AUTO: 38.8 FL (ref 37–54)
EGFRCR SERPLBLD CKD-EPI 2021: 80.7 ML/MIN/1.73
EOSINOPHIL # BLD AUTO: 0.06 10*3/MM3 (ref 0–0.4)
EOSINOPHIL NFR BLD AUTO: 0.5 % (ref 0.3–6.2)
ERYTHROCYTE [DISTWIDTH] IN BLOOD BY AUTOMATED COUNT: 12 % (ref 12.3–15.4)
GLOBULIN UR ELPH-MCNC: 2.6 GM/DL
GLUCOSE SERPL-MCNC: 114 MG/DL (ref 65–99)
HCT VFR BLD AUTO: 29.8 % (ref 34–46.6)
HGB BLD-MCNC: 10.1 G/DL (ref 12–15.9)
IMM GRANULOCYTES # BLD AUTO: 0.05 10*3/MM3 (ref 0–0.05)
IMM GRANULOCYTES NFR BLD AUTO: 0.4 % (ref 0–0.5)
LYMPHOCYTES # BLD AUTO: 1.71 10*3/MM3 (ref 0.7–3.1)
LYMPHOCYTES NFR BLD AUTO: 15.3 % (ref 19.6–45.3)
MCH RBC QN AUTO: 29.8 PG (ref 26.6–33)
MCHC RBC AUTO-ENTMCNC: 33.9 G/DL (ref 31.5–35.7)
MCV RBC AUTO: 87.9 FL (ref 79–97)
MONOCYTES # BLD AUTO: 0.81 10*3/MM3 (ref 0.1–0.9)
MONOCYTES NFR BLD AUTO: 7.3 % (ref 5–12)
NEUTROPHILS NFR BLD AUTO: 76.1 % (ref 42.7–76)
NEUTROPHILS NFR BLD AUTO: 8.49 10*3/MM3 (ref 1.7–7)
NRBC BLD AUTO-RTO: 0 /100 WBC (ref 0–0.2)
PLATELET # BLD AUTO: 299 10*3/MM3 (ref 140–450)
PMV BLD AUTO: 9.8 FL (ref 6–12)
POTASSIUM SERPL-SCNC: 3.7 MMOL/L (ref 3.5–5.2)
PROT SERPL-MCNC: 7.1 G/DL (ref 6–8.5)
QT INTERVAL: 373 MS
RBC # BLD AUTO: 3.39 10*6/MM3 (ref 3.77–5.28)
SODIUM SERPL-SCNC: 136 MMOL/L (ref 136–145)
WBC NRBC COR # BLD: 11.16 10*3/MM3 (ref 3.4–10.8)

## 2022-12-06 PROCEDURE — 80053 COMPREHEN METABOLIC PANEL: CPT | Performed by: EMERGENCY MEDICINE

## 2022-12-06 PROCEDURE — 73502 X-RAY EXAM HIP UNI 2-3 VIEWS: CPT

## 2022-12-06 PROCEDURE — 93005 ELECTROCARDIOGRAM TRACING: CPT | Performed by: PHYSICIAN ASSISTANT

## 2022-12-06 PROCEDURE — 25010000002 HYDROMORPHONE PER 4 MG: Performed by: INTERNAL MEDICINE

## 2022-12-06 PROCEDURE — 99285 EMERGENCY DEPT VISIT HI MDM: CPT

## 2022-12-06 PROCEDURE — 85025 COMPLETE CBC W/AUTO DIFF WBC: CPT | Performed by: EMERGENCY MEDICINE

## 2022-12-06 PROCEDURE — 71045 X-RAY EXAM CHEST 1 VIEW: CPT

## 2022-12-06 PROCEDURE — 25010000002 ONDANSETRON PER 1 MG: Performed by: EMERGENCY MEDICINE

## 2022-12-06 PROCEDURE — 25010000002 HYDROMORPHONE PER 4 MG: Performed by: EMERGENCY MEDICINE

## 2022-12-06 PROCEDURE — 93010 ELECTROCARDIOGRAM REPORT: CPT | Performed by: INTERNAL MEDICINE

## 2022-12-06 RX ORDER — ONDANSETRON 4 MG/1
4 TABLET, FILM COATED ORAL EVERY 6 HOURS PRN
Status: DISCONTINUED | OUTPATIENT
Start: 2022-12-06 | End: 2022-12-09 | Stop reason: HOSPADM

## 2022-12-06 RX ORDER — HYDROCODONE BITARTRATE AND ACETAMINOPHEN 5; 325 MG/1; MG/1
1 TABLET ORAL EVERY 4 HOURS PRN
Status: DISCONTINUED | OUTPATIENT
Start: 2022-12-06 | End: 2022-12-07

## 2022-12-06 RX ORDER — NALOXONE HCL 0.4 MG/ML
0.4 VIAL (ML) INJECTION
Status: DISCONTINUED | OUTPATIENT
Start: 2022-12-06 | End: 2022-12-09 | Stop reason: HOSPADM

## 2022-12-06 RX ORDER — MORPHINE SULFATE 2 MG/ML
4 INJECTION, SOLUTION INTRAMUSCULAR; INTRAVENOUS ONCE
Status: DISCONTINUED | OUTPATIENT
Start: 2022-12-06 | End: 2022-12-06

## 2022-12-06 RX ORDER — DOCUSATE SODIUM 100 MG/1
100 CAPSULE, LIQUID FILLED ORAL 2 TIMES DAILY
Status: DISCONTINUED | OUTPATIENT
Start: 2022-12-06 | End: 2022-12-09 | Stop reason: HOSPADM

## 2022-12-06 RX ORDER — HYDROMORPHONE HYDROCHLORIDE 1 MG/ML
0.5 INJECTION, SOLUTION INTRAMUSCULAR; INTRAVENOUS; SUBCUTANEOUS ONCE
Status: COMPLETED | OUTPATIENT
Start: 2022-12-06 | End: 2022-12-06

## 2022-12-06 RX ORDER — CEFAZOLIN SODIUM 2 G/100ML
2 INJECTION, SOLUTION INTRAVENOUS
Status: COMPLETED | OUTPATIENT
Start: 2022-12-07 | End: 2022-12-07

## 2022-12-06 RX ORDER — LISINOPRIL 10 MG/1
10 TABLET ORAL DAILY
Status: DISCONTINUED | OUTPATIENT
Start: 2022-12-06 | End: 2022-12-09 | Stop reason: HOSPADM

## 2022-12-06 RX ORDER — MELATONIN
2000 DAILY
Status: DISCONTINUED | OUTPATIENT
Start: 2022-12-06 | End: 2022-12-09 | Stop reason: HOSPADM

## 2022-12-06 RX ORDER — UREA 10 %
3 LOTION (ML) TOPICAL NIGHTLY PRN
Status: DISCONTINUED | OUTPATIENT
Start: 2022-12-06 | End: 2022-12-09 | Stop reason: HOSPADM

## 2022-12-06 RX ORDER — HYDROMORPHONE HYDROCHLORIDE 1 MG/ML
0.5 INJECTION, SOLUTION INTRAMUSCULAR; INTRAVENOUS; SUBCUTANEOUS
Status: DISCONTINUED | OUTPATIENT
Start: 2022-12-06 | End: 2022-12-09 | Stop reason: HOSPADM

## 2022-12-06 RX ORDER — PANTOPRAZOLE SODIUM 40 MG/1
40 TABLET, DELAYED RELEASE ORAL DAILY
Status: DISCONTINUED | OUTPATIENT
Start: 2022-12-06 | End: 2022-12-09 | Stop reason: HOSPADM

## 2022-12-06 RX ORDER — CYCLOBENZAPRINE HCL 10 MG
5 TABLET ORAL 3 TIMES DAILY PRN
Status: DISCONTINUED | OUTPATIENT
Start: 2022-12-06 | End: 2022-12-09 | Stop reason: HOSPADM

## 2022-12-06 RX ORDER — SODIUM CHLORIDE 0.9 % (FLUSH) 0.9 %
10 SYRINGE (ML) INJECTION AS NEEDED
Status: DISCONTINUED | OUTPATIENT
Start: 2022-12-06 | End: 2022-12-09 | Stop reason: HOSPADM

## 2022-12-06 RX ORDER — SODIUM CHLORIDE 9 MG/ML
75 INJECTION, SOLUTION INTRAVENOUS CONTINUOUS
Status: DISCONTINUED | OUTPATIENT
Start: 2022-12-06 | End: 2022-12-09 | Stop reason: HOSPADM

## 2022-12-06 RX ORDER — ONDANSETRON 2 MG/ML
4 INJECTION INTRAMUSCULAR; INTRAVENOUS EVERY 6 HOURS PRN
Status: DISCONTINUED | OUTPATIENT
Start: 2022-12-06 | End: 2022-12-09 | Stop reason: HOSPADM

## 2022-12-06 RX ORDER — ONDANSETRON 2 MG/ML
4 INJECTION INTRAMUSCULAR; INTRAVENOUS ONCE
Status: COMPLETED | OUTPATIENT
Start: 2022-12-06 | End: 2022-12-06

## 2022-12-06 RX ORDER — ACETAMINOPHEN 325 MG/1
650 TABLET ORAL EVERY 4 HOURS PRN
Status: DISCONTINUED | OUTPATIENT
Start: 2022-12-06 | End: 2022-12-09 | Stop reason: HOSPADM

## 2022-12-06 RX ORDER — FERROUS SULFATE 325(65) MG
325 TABLET ORAL
Status: DISCONTINUED | OUTPATIENT
Start: 2022-12-07 | End: 2022-12-09 | Stop reason: HOSPADM

## 2022-12-06 RX ADMIN — HYDROMORPHONE HYDROCHLORIDE 0.5 MG: 1 INJECTION, SOLUTION INTRAMUSCULAR; INTRAVENOUS; SUBCUTANEOUS at 15:07

## 2022-12-06 RX ADMIN — SODIUM CHLORIDE 75 ML/HR: 9 INJECTION, SOLUTION INTRAVENOUS at 18:22

## 2022-12-06 RX ADMIN — ONDANSETRON 4 MG: 2 INJECTION INTRAMUSCULAR; INTRAVENOUS at 13:30

## 2022-12-06 RX ADMIN — HYDROMORPHONE HYDROCHLORIDE 0.5 MG: 1 INJECTION, SOLUTION INTRAMUSCULAR; INTRAVENOUS; SUBCUTANEOUS at 22:13

## 2022-12-06 RX ADMIN — HYDROMORPHONE HYDROCHLORIDE 0.5 MG: 1 INJECTION, SOLUTION INTRAMUSCULAR; INTRAVENOUS; SUBCUTANEOUS at 18:22

## 2022-12-06 RX ADMIN — HYDROCODONE BITARTRATE AND ACETAMINOPHEN 1 TABLET: 5; 325 TABLET ORAL at 23:51

## 2022-12-06 RX ADMIN — HYDROMORPHONE HYDROCHLORIDE 0.5 MG: 1 INJECTION, SOLUTION INTRAMUSCULAR; INTRAVENOUS; SUBCUTANEOUS at 13:28

## 2022-12-06 NOTE — ED NOTES
"Nursing report ED to floor  Drea Hill  67 y.o.  female    HPI :   Chief Complaint   Patient presents with    Hip Pain    Fall       Admitting doctor:   Jenna Connor MD    Admitting diagnosis:   The encounter diagnosis was Closed fracture of right hip, initial encounter ([facility]).    Code status:   Current Code Status       Date Active Code Status Order ID Comments User Context       Prior            Allergies:   Morphine    Isolation:   No active isolations    Intake and Output  No intake or output data in the 24 hours ending 12/06/22 1549    Weight:       12/06/22  1310   Weight: 72.6 kg (160 lb)       Most recent vitals:   Vitals:    12/06/22 1309 12/06/22 1310 12/06/22 1446   BP: 138/63  143/79   BP Location:   Left arm   Patient Position: Lying  Lying   Pulse: 90  79   Resp: 18  18   Temp:  99 °F (37.2 °C)    TempSrc:  Tympanic    SpO2: 99%  100%   Weight:  72.6 kg (160 lb)    Height:  162.6 cm (64\")        Active LDAs/IV Access:   Lines, Drains & Airways       Active LDAs       Name Placement date Placement time Site Days    Peripheral IV 12/06/22 1326 Left Forearm 12/06/22  1326  Forearm  less than 1                    Labs (abnormal labs have a star):   Labs Reviewed   COMPREHENSIVE METABOLIC PANEL - Abnormal; Notable for the following components:       Result Value    Glucose 114 (*)     All other components within normal limits    Narrative:     GFR Normal >60  Chronic Kidney Disease <60  Kidney Failure <15     CBC WITH AUTO DIFFERENTIAL - Abnormal; Notable for the following components:    WBC 11.16 (*)     RBC 3.39 (*)     Hemoglobin 10.1 (*)     Hematocrit 29.8 (*)     RDW 12.0 (*)     Neutrophil % 76.1 (*)     Lymphocyte % 15.3 (*)     Neutrophils, Absolute 8.49 (*)     All other components within normal limits   CBC AND DIFFERENTIAL    Narrative:     The following orders were created for panel order CBC & Differential.  Procedure                               Abnormality         Status            "          ---------                               -----------         ------                     CBC Auto Differential[[id number]]        Abnormal            Final result                 Please view results for these tests on the individual orders.       EKG:   ECG 12 Lead Pre-Op / Pre-Procedure   Preliminary Result   HEART RATE= 81  bpm   RR Interval= 741  ms   NV Interval= 182  ms   P Horizontal Axis= 20  deg   P Front Axis= 60  deg   QRSD Interval= 99  ms   QT Interval= 373  ms   QRS Axis= 38  deg   T Wave Axis= 44  deg   - OTHERWISE NORMAL ECG -   Sinus rhythm   Low voltage, extremity leads   Electronically Signed By:    Date and Time of Study: 2022-12-06 14:10:15          Meds given in ED:   Medications   sodium chloride 0.9 % flush 10 mL (has no administration in time range)   ceFAZolin in dextrose (ANCEF) IVPB solution 2 g (has no administration in time range)   ondansetron (ZOFRAN) injection 4 mg (4 mg Intravenous Given 12/6/22 1330)   HYDROmorphone (DILAUDID) injection 0.5 mg (0.5 mg Intravenous Given 12/6/22 1328)   HYDROmorphone (DILAUDID) injection 0.5 mg (0.5 mg Intravenous Given 12/6/22 1507)       Imaging results:  XR Chest 1 View    Result Date: 12/6/2022  No evidence for acute pulmonary process. Follow-up as clinical indications persist.  This report was finalized on 12/6/2022 2:25 PM by Dr. Harvinder Rodriguez M.D.      XR Hip With or Without Pelvis 2 - 3 View Right    Result Date: 12/6/2022   Right intertrochanteric fracture.  This report was finalized on 12/6/2022 2:24 PM by Dr. Harvinder Rodriguez M.D.       Ambulatory status:   - non-ambulatory d/t break     Social issues:   Social History     Socioeconomic History    Marital status:    Tobacco Use    Smoking status: Never    Smokeless tobacco: Never   Substance and Sexual Activity    Alcohol use: No    Drug use: No    Sexual activity: Defer       NIH Stroke Scale:         Ann Payton RN  12/06/22 15:49 EST

## 2022-12-06 NOTE — ED TRIAGE NOTES
Pt arrives via EMS from home. States that she was outside and slipped and fell landing on her right side. Complains of right sided hip pain with some shortening. Pt is not able to ambulate.       Patient masked at arrival and triage staff wore all appropriate PPE during entire encounter with patient.

## 2022-12-06 NOTE — ED PROVIDER NOTES
EMERGENCY DEPARTMENT ENCOUNTER    Room Number:  HA2/B  Date of encounter:  12/6/2022  PCP: Lilia Turner APRN  Historian: Patient, EMS      I used full protective equipment while examining this patient.  This includes face mask, gloves and protective eyewear.  I washed my hands before entering the room and immediately upon leaving the room      HPI:  Chief Complaint: Fall  A complete HPI/ROS/PMH/PSH/SH/FH are unobtainable due to: Nothing    Context: Drea Hill is a 67 y.o. female who presents to the ED c/o injury sustained in mechanical fall prior to arrival.  Patient states she was working outside when she slipped on the wet ground landing on her right hip.  Patient denies any head, neck, back trauma.  Patient complains of severe, achy, constant right hip pain.  There is no radiation of pain.  She denies any numbness or tingling distally.  She was unable to get off the ground secondary to pain.  She takes no blood thinners.  She was in her normal state of health prior to this fall.    Review of Medical Records  I reviewed patient's last primary care office visit from 9/30/2022.  Patient being followed for hypertension, depression.    PAST MEDICAL HISTORY  Active Ambulatory Problems     Diagnosis Date Noted   • Closed displaced fracture of surgical neck of right humerus 01/08/2019   • Closed fracture of upper end of humerus 01/09/2019     Resolved Ambulatory Problems     Diagnosis Date Noted   • No Resolved Ambulatory Problems     Past Medical History:   Diagnosis Date   • Anemia    • Arthritis    • Fracture, humerus 01/05/2019   • GERD (gastroesophageal reflux disease)    • Hypertension          PAST SURGICAL HISTORY  Past Surgical History:   Procedure Laterality Date   • APPENDECTOMY     • CHOLECYSTECTOMY     • MOUTH SURGERY      benign mouth tumors x 2   • ORIF HUMERUS FRACTURE Right 1/10/2019    Procedure: Open Reduction and Internal Fixation Proximal Humerus;  Surgeon: Albert Rosales MD;  Location:   [facility];  Service: Orthopedics         FAMILY HISTORY  History reviewed. No pertinent family history.      SOCIAL HISTORY  Social History     Socioeconomic History   • Marital status:    Tobacco Use   • Smoking status: Never   • Smokeless tobacco: Never   Substance and Sexual Activity   • Alcohol use: No   • Drug use: No   • Sexual activity: Defer         ALLERGIES  Morphine        REVIEW OF SYSTEMS  All systems reviewed and negative except for those discussed in HPI.       PHYSICAL EXAM    I have reviewed the triage vital signs and nursing notes.    ED Triage Vitals   Temp Heart Rate Resp BP SpO2   12/06/22 1310 12/06/22 1309 12/06/22 1309 12/06/22 1309 12/06/22 1309   99 °F (37.2 °C) 90 18 138/63 99 %      Temp src Heart Rate Source Patient Position BP Location FiO2 (%)   12/06/22 1310 12/06/22 1309 12/06/22 1309 -- --   Tympanic Monitor Lying         Physical Exam  GENERAL: Alert, oriented, mild distress secondary to pain   HENT: head atraumatic, no nuchal rigidity  EYES: no scleral icterus, EOMI  CV: regular rhythm, regular rate, no murmur  RESPIRATORY: normal effort, CTA  ABDOMEN: soft, nontender  MUSCULOSKELETAL: Mild tenderness to right lateral hip.  Decreased range of motion.  Right lower extremity is shortened and externally rotated.  Neurovascularly intact distally.  NEURO: alert, moves all extremities, follows commands  SKIN: warm, dry        LAB RESULTS  Recent Results (from the past 24 hour(s))   Comprehensive Metabolic Panel    Collection Time: 12/06/22  1:26 PM    Specimen: Blood   Result Value Ref Range    Glucose 114 (H) 65 - 99 mg/dL    BUN 12 8 - 23 mg/dL    Creatinine 0.81 0.57 - 1.00 mg/dL    Sodium 136 136 - 145 mmol/L    Potassium 3.7 3.5 - 5.2 mmol/L    Chloride 101 98 - 107 mmol/L    CO2 23.8 22.0 - 29.0 mmol/L    Calcium 9.2 8.6 - 10.5 mg/dL    Total Protein 7.1 6.0 - 8.5 g/dL    Albumin 4.50 3.50 - 5.20 g/dL    ALT (SGPT) 11 1 - 33 U/L    AST (SGOT) 14 1 - 32 U/L    Alkaline  Phosphatase 78 39 - 117 U/L    Total Bilirubin 0.3 0.0 - 1.2 mg/dL    Globulin 2.6 gm/dL    A/G Ratio 1.7 g/dL    BUN/Creatinine Ratio 14.8 7.0 - 25.0    Anion Gap 11.2 5.0 - 15.0 mmol/L    eGFR 80.7 >60.0 mL/min/1.73   CBC Auto Differential    Collection Time: 12/06/22  1:26 PM    Specimen: Blood   Result Value Ref Range    WBC 11.16 (H) 3.40 - 10.80 10*3/mm3    RBC 3.39 (L) 3.77 - 5.28 10*6/mm3    Hemoglobin 10.1 (L) 12.0 - 15.9 g/dL    Hematocrit 29.8 (L) 34.0 - 46.6 %    MCV 87.9 79.0 - 97.0 fL    MCH 29.8 26.6 - 33.0 pg    MCHC 33.9 31.5 - 35.7 g/dL    RDW 12.0 (L) 12.3 - 15.4 %    RDW-SD 38.8 37.0 - 54.0 fl    MPV 9.8 6.0 - 12.0 fL    Platelets 299 140 - 450 10*3/mm3    Neutrophil % 76.1 (H) 42.7 - 76.0 %    Lymphocyte % 15.3 (L) 19.6 - 45.3 %    Monocyte % 7.3 5.0 - 12.0 %    Eosinophil % 0.5 0.3 - 6.2 %    Basophil % 0.4 0.0 - 1.5 %    Immature Grans % 0.4 0.0 - 0.5 %    Neutrophils, Absolute 8.49 (H) 1.70 - 7.00 10*3/mm3    Lymphocytes, Absolute 1.71 0.70 - 3.10 10*3/mm3    Monocytes, Absolute 0.81 0.10 - 0.90 10*3/mm3    Eosinophils, Absolute 0.06 0.00 - 0.40 10*3/mm3    Basophils, Absolute 0.04 0.00 - 0.20 10*3/mm3    Immature Grans, Absolute 0.05 0.00 - 0.05 10*3/mm3    nRBC 0.0 0.0 - 0.2 /100 WBC   ECG 12 Lead Pre-Op / Pre-Procedure    Collection Time: 12/06/22  2:10 PM   Result Value Ref Range    QT Interval 373 ms       Ordered the above labs and independently reviewed the results.        RADIOLOGY  XR Chest 1 View    Result Date: 12/6/2022  XR CHEST 1 VW-  HISTORY: Female who is 67 years-old,  preoperative evaluation  TECHNIQUE: Frontal view of the chest  COMPARISON: None available  FINDINGS: Heart size is normal. Aorta is calcified. Pulmonary vasculature is unremarkable. No focal pulmonary consolidation, pleural effusion, or pneumothorax. Old granulomatous disease is apparent. No acute osseous process.      No evidence for acute pulmonary process. Follow-up as clinical indications persist.  This  report was finalized on 12/6/2022 2:25 PM by Dr. Harvinder Rodriguez M.D.      XR Hip With or Without Pelvis 2 - 3 View Right    Result Date: 12/6/2022  XR HIP W OR WO PELVIS 2-3 VIEW RIGHT-  INDICATIONS: Trauma  TECHNIQUE: Frontal view the pelvis, 3 views of the right hip  COMPARISON: None available  FINDINGS:  Angulated, comminuted right intertrochanteric fracture is noted. No other fractures are identified. No dislocation. Degenerative changes seen at the stenosis pubis and partly included lumbar spine.       Right intertrochanteric fracture.  This report was finalized on 12/6/2022 2:24 PM by Dr. Harvinder Rodriguez M.D.        I ordered the above noted radiological studies. Reviewed by me and discussed with radiologist.  See dictation for official radiology interpretation.      MEDICATIONS GIVEN IN ER    Medications   sodium chloride 0.9 % flush 10 mL (has no administration in time range)   ceFAZolin in dextrose (ANCEF) IVPB solution 2 g (has no administration in time range)   ondansetron (ZOFRAN) injection 4 mg (4 mg Intravenous Given 12/6/22 1330)   HYDROmorphone (DILAUDID) injection 0.5 mg (0.5 mg Intravenous Given 12/6/22 1328)   HYDROmorphone (DILAUDID) injection 0.5 mg (0.5 mg Intravenous Given 12/6/22 1507)         PROGRESS, DATA ANALYSIS, CONSULTS, AND MEDICAL DECISION MAKING    All labs have been independently reviewed by me.  All radiology studies have been reviewed by me and discussed with radiologist dictating the report.   EKG's independently viewed and interpreted by me.  Discussion below represents my analysis of pertinent findings related to patient's condition, differential diagnosis, treatment plan and final disposition.    I have discussed case with Dr. Daniels, emergency room physician.  She agrees with treatment plan.    ED Course as of 12/06/22 1517   Tue Dec 06, 2022   1323 Patient presents with right hip pain after mechanical fall prior to arrival.  Plan for x-rays and reevaluation. [EE]    1348 Right hip films interpreted myself show a comminuted right intertrochanteric hip fracture. [EE]   1349 WBC(!): 11.16 [EE]   1349 Hemoglobin(!): 10.1 [EE]   1412 Creatinine: 0.81 [EE]   1413 EKG interpreted myself.  Time 1410.  Sinus rhythm, 81 bpm.  Normal P/JOHNATHON.  QRS normal normal axis.  No significant ST abnormalities.  EKG looks similar to prior EKG from 1/10/2019. [EE]   1415 I discussed case with Dr. Connor, [facility].  She agrees to admit. [EE]   1417 I discussed case with Dr. Carvajal, orthopedics.  He agrees to admit. [EE]      ED Course User Index  [EE] Matthew Barros PA       AS OF 15:17 EST VITALS:    BP - 143/79  HR - 79  TEMP - 99 °F (37.2 °C) (Tympanic)  O2 SATS - 100%        DIAGNOSIS  Final diagnoses:   Closed fracture of right hip, initial encounter ([facility])         DISPOSITION  Admitted      Dictated utilizing Dragon dictation     Matthew Barros PA  12/06/22 4050

## 2022-12-07 ENCOUNTER — APPOINTMENT (OUTPATIENT)
Dept: GENERAL RADIOLOGY | Facility: HOSPITAL | Age: 67
End: 2022-12-07

## 2022-12-07 ENCOUNTER — ANESTHESIA EVENT (OUTPATIENT)
Dept: PERIOP | Facility: HOSPITAL | Age: 67
End: 2022-12-07

## 2022-12-07 ENCOUNTER — ANESTHESIA (OUTPATIENT)
Dept: PERIOP | Facility: HOSPITAL | Age: 67
End: 2022-12-07

## 2022-12-07 LAB
ABO GROUP BLD: NORMAL
ANION GAP SERPL CALCULATED.3IONS-SCNC: 9.8 MMOL/L (ref 5–15)
BLD GP AB SCN SERPL QL: NEGATIVE
BUN SERPL-MCNC: 18 MG/DL (ref 8–23)
BUN/CREAT SERPL: 28.6 (ref 7–25)
CALCIUM SPEC-SCNC: 8.7 MG/DL (ref 8.6–10.5)
CHLORIDE SERPL-SCNC: 97 MMOL/L (ref 98–107)
CO2 SERPL-SCNC: 26.2 MMOL/L (ref 22–29)
CREAT SERPL-MCNC: 0.63 MG/DL (ref 0.57–1)
DEPRECATED RDW RBC AUTO: 38.4 FL (ref 37–54)
EGFRCR SERPLBLD CKD-EPI 2021: 98.6 ML/MIN/1.73
ERYTHROCYTE [DISTWIDTH] IN BLOOD BY AUTOMATED COUNT: 11.9 % (ref 12.3–15.4)
FERRITIN SERPL-MCNC: 99.6 NG/ML (ref 13–150)
FOLATE SERPL-MCNC: 16.3 NG/ML (ref 4.78–24.2)
GLUCOSE SERPL-MCNC: 114 MG/DL (ref 65–99)
HAPTOGLOB SERPL-MCNC: 130 MG/DL (ref 30–200)
HCT VFR BLD AUTO: 25 % (ref 34–46.6)
HGB BLD-MCNC: 8.1 G/DL (ref 12–15.9)
IRON 24H UR-MRATE: 15 MCG/DL (ref 37–145)
IRON SATN MFR SERPL: 6 % (ref 20–50)
LDH SERPL-CCNC: 123 U/L (ref 135–214)
MCH RBC QN AUTO: 29 PG (ref 26.6–33)
MCHC RBC AUTO-ENTMCNC: 32.4 G/DL (ref 31.5–35.7)
MCV RBC AUTO: 89.6 FL (ref 79–97)
PLATELET # BLD AUTO: 231 10*3/MM3 (ref 140–450)
PMV BLD AUTO: 9.9 FL (ref 6–12)
POTASSIUM SERPL-SCNC: 4 MMOL/L (ref 3.5–5.2)
RBC # BLD AUTO: 2.79 10*6/MM3 (ref 3.77–5.28)
RETICS # AUTO: 0.04 10*6/MM3 (ref 0.02–0.13)
RETICS/RBC NFR AUTO: 1.47 % (ref 0.7–1.9)
RH BLD: POSITIVE
SODIUM SERPL-SCNC: 133 MMOL/L (ref 136–145)
T&S EXPIRATION DATE: NORMAL
TIBC SERPL-MCNC: 237 MCG/DL (ref 298–536)
TRANSFERRIN SERPL-MCNC: 159 MG/DL (ref 200–360)
VIT B12 BLD-MCNC: <150 PG/ML (ref 211–946)
WBC NRBC COR # BLD: 6.24 10*3/MM3 (ref 3.4–10.8)

## 2022-12-07 PROCEDURE — 25010000002 CEFAZOLIN IN DEXTROSE 2-4 GM/100ML-% SOLUTION: Performed by: ORTHOPAEDIC SURGERY

## 2022-12-07 PROCEDURE — C1713 ANCHOR/SCREW BN/BN,TIS/BN: HCPCS | Performed by: ORTHOPAEDIC SURGERY

## 2022-12-07 PROCEDURE — 83540 ASSAY OF IRON: CPT | Performed by: INTERNAL MEDICINE

## 2022-12-07 PROCEDURE — 83615 LACTATE (LD) (LDH) ENZYME: CPT | Performed by: INTERNAL MEDICINE

## 2022-12-07 PROCEDURE — 25010000002 HYDROMORPHONE PER 4 MG: Performed by: NURSE ANESTHETIST, CERTIFIED REGISTERED

## 2022-12-07 PROCEDURE — 25010000002 FENTANYL CITRATE (PF) 50 MCG/ML SOLUTION: Performed by: NURSE ANESTHETIST, CERTIFIED REGISTERED

## 2022-12-07 PROCEDURE — 83010 ASSAY OF HAPTOGLOBIN QUANT: CPT | Performed by: INTERNAL MEDICINE

## 2022-12-07 PROCEDURE — 25010000002 FENTANYL CITRATE (PF) 50 MCG/ML SOLUTION: Performed by: STUDENT IN AN ORGANIZED HEALTH CARE EDUCATION/TRAINING PROGRAM

## 2022-12-07 PROCEDURE — 86923 COMPATIBILITY TEST ELECTRIC: CPT

## 2022-12-07 PROCEDURE — 25010000002 MIDAZOLAM PER 1 MG: Performed by: STUDENT IN AN ORGANIZED HEALTH CARE EDUCATION/TRAINING PROGRAM

## 2022-12-07 PROCEDURE — 84466 ASSAY OF TRANSFERRIN: CPT | Performed by: INTERNAL MEDICINE

## 2022-12-07 PROCEDURE — 82746 ASSAY OF FOLIC ACID SERUM: CPT | Performed by: INTERNAL MEDICINE

## 2022-12-07 PROCEDURE — 82728 ASSAY OF FERRITIN: CPT | Performed by: INTERNAL MEDICINE

## 2022-12-07 PROCEDURE — 25010000002 DEXAMETHASONE SODIUM PHOSPHATE 20 MG/5ML SOLUTION: Performed by: NURSE ANESTHETIST, CERTIFIED REGISTERED

## 2022-12-07 PROCEDURE — 25010000002 ONDANSETRON PER 1 MG: Performed by: NURSE ANESTHETIST, CERTIFIED REGISTERED

## 2022-12-07 PROCEDURE — 82607 VITAMIN B-12: CPT | Performed by: INTERNAL MEDICINE

## 2022-12-07 PROCEDURE — 76000 FLUOROSCOPY <1 HR PHYS/QHP: CPT

## 2022-12-07 PROCEDURE — 25010000002 HYDROMORPHONE PER 4 MG: Performed by: INTERNAL MEDICINE

## 2022-12-07 PROCEDURE — 85045 AUTOMATED RETICULOCYTE COUNT: CPT | Performed by: INTERNAL MEDICINE

## 2022-12-07 PROCEDURE — 85027 COMPLETE CBC AUTOMATED: CPT | Performed by: INTERNAL MEDICINE

## 2022-12-07 PROCEDURE — 86900 BLOOD TYPING SEROLOGIC ABO: CPT | Performed by: ORTHOPAEDIC SURGERY

## 2022-12-07 PROCEDURE — 86901 BLOOD TYPING SEROLOGIC RH(D): CPT | Performed by: ORTHOPAEDIC SURGERY

## 2022-12-07 PROCEDURE — 83036 HEMOGLOBIN GLYCOSYLATED A1C: CPT | Performed by: INTERNAL MEDICINE

## 2022-12-07 PROCEDURE — 25010000002 PHENYLEPHRINE 10 MG/ML SOLUTION: Performed by: NURSE ANESTHETIST, CERTIFIED REGISTERED

## 2022-12-07 PROCEDURE — 73501 X-RAY EXAM HIP UNI 1 VIEW: CPT

## 2022-12-07 PROCEDURE — 0QS636Z REPOSITION RIGHT UPPER FEMUR WITH INTRAMEDULLARY INTERNAL FIXATION DEVICE, PERCUTANEOUS APPROACH: ICD-10-PCS | Performed by: ORTHOPAEDIC SURGERY

## 2022-12-07 PROCEDURE — 86850 RBC ANTIBODY SCREEN: CPT | Performed by: ORTHOPAEDIC SURGERY

## 2022-12-07 PROCEDURE — 25010000002 PROPOFOL 10 MG/ML EMULSION: Performed by: NURSE ANESTHETIST, CERTIFIED REGISTERED

## 2022-12-07 PROCEDURE — 80048 BASIC METABOLIC PNL TOTAL CA: CPT | Performed by: INTERNAL MEDICINE

## 2022-12-07 PROCEDURE — 73502 X-RAY EXAM HIP UNI 2-3 VIEWS: CPT

## 2022-12-07 DEVICE — TRIGEN INTERTAN 10S 10MM X 18CM 125DEGREE
Type: IMPLANTABLE DEVICE | Site: TROCHANTER | Status: FUNCTIONAL
Brand: TRIGEN

## 2022-12-07 DEVICE — INTERTAN LAG/COMPRESSION SCREW KIT                                    90MM / 85MM
Type: IMPLANTABLE DEVICE | Site: TROCHANTER | Status: FUNCTIONAL
Brand: TRIGEN

## 2022-12-07 DEVICE — TRIGEN LOW PROFILE SCREW 5.0MM X 32.5MM
Type: IMPLANTABLE DEVICE | Site: TROCHANTER | Status: FUNCTIONAL
Brand: TRIGEN

## 2022-12-07 RX ORDER — LIDOCAINE HYDROCHLORIDE 20 MG/ML
INJECTION, SOLUTION EPIDURAL; INFILTRATION; INTRACAUDAL; PERINEURAL AS NEEDED
Status: DISCONTINUED | OUTPATIENT
Start: 2022-12-07 | End: 2022-12-07 | Stop reason: SURG

## 2022-12-07 RX ORDER — HYDROCODONE BITARTRATE AND ACETAMINOPHEN 5; 325 MG/1; MG/1
1 TABLET ORAL EVERY 4 HOURS PRN
Status: DISCONTINUED | OUTPATIENT
Start: 2022-12-07 | End: 2022-12-08

## 2022-12-07 RX ORDER — FLUMAZENIL 0.1 MG/ML
0.2 INJECTION INTRAVENOUS AS NEEDED
Status: DISCONTINUED | OUTPATIENT
Start: 2022-12-07 | End: 2022-12-07 | Stop reason: HOSPADM

## 2022-12-07 RX ORDER — ASPIRIN 81 MG/1
81 TABLET, CHEWABLE ORAL 2 TIMES DAILY
Status: DISCONTINUED | OUTPATIENT
Start: 2022-12-07 | End: 2022-12-09 | Stop reason: HOSPADM

## 2022-12-07 RX ORDER — DEXAMETHASONE SODIUM PHOSPHATE 4 MG/ML
INJECTION, SOLUTION INTRA-ARTICULAR; INTRALESIONAL; INTRAMUSCULAR; INTRAVENOUS; SOFT TISSUE AS NEEDED
Status: DISCONTINUED | OUTPATIENT
Start: 2022-12-07 | End: 2022-12-07 | Stop reason: SURG

## 2022-12-07 RX ORDER — SERTRALINE HYDROCHLORIDE 100 MG/1
1 TABLET, FILM COATED ORAL DAILY
COMMUNITY
Start: 2022-12-06

## 2022-12-07 RX ORDER — HYDROCODONE BITARTRATE AND ACETAMINOPHEN 7.5; 325 MG/1; MG/1
1 TABLET ORAL ONCE AS NEEDED
Status: COMPLETED | OUTPATIENT
Start: 2022-12-07 | End: 2022-12-07

## 2022-12-07 RX ORDER — FENTANYL CITRATE 50 UG/ML
INJECTION, SOLUTION INTRAMUSCULAR; INTRAVENOUS AS NEEDED
Status: DISCONTINUED | OUTPATIENT
Start: 2022-12-07 | End: 2022-12-07 | Stop reason: SURG

## 2022-12-07 RX ORDER — MAGNESIUM HYDROXIDE 1200 MG/15ML
LIQUID ORAL AS NEEDED
Status: DISCONTINUED | OUTPATIENT
Start: 2022-12-07 | End: 2022-12-07 | Stop reason: HOSPADM

## 2022-12-07 RX ORDER — PHENYLEPHRINE HYDROCHLORIDE 10 MG/ML
INJECTION INTRAVENOUS AS NEEDED
Status: DISCONTINUED | OUTPATIENT
Start: 2022-12-07 | End: 2022-12-07 | Stop reason: SURG

## 2022-12-07 RX ORDER — BUPIVACAINE HYDROCHLORIDE AND EPINEPHRINE 5; 5 MG/ML; UG/ML
INJECTION, SOLUTION EPIDURAL; INTRACAUDAL; PERINEURAL AS NEEDED
Status: DISCONTINUED | OUTPATIENT
Start: 2022-12-07 | End: 2022-12-07 | Stop reason: HOSPADM

## 2022-12-07 RX ORDER — DIPHENHYDRAMINE HYDROCHLORIDE 50 MG/ML
12.5 INJECTION INTRAMUSCULAR; INTRAVENOUS
Status: DISCONTINUED | OUTPATIENT
Start: 2022-12-07 | End: 2022-12-07 | Stop reason: HOSPADM

## 2022-12-07 RX ORDER — ROCURONIUM BROMIDE 10 MG/ML
INJECTION, SOLUTION INTRAVENOUS AS NEEDED
Status: DISCONTINUED | OUTPATIENT
Start: 2022-12-07 | End: 2022-12-07 | Stop reason: SURG

## 2022-12-07 RX ORDER — SODIUM CHLORIDE 0.9 % (FLUSH) 0.9 %
3-10 SYRINGE (ML) INJECTION AS NEEDED
Status: DISCONTINUED | OUTPATIENT
Start: 2022-12-07 | End: 2022-12-07 | Stop reason: HOSPADM

## 2022-12-07 RX ORDER — ONDANSETRON 2 MG/ML
INJECTION INTRAMUSCULAR; INTRAVENOUS AS NEEDED
Status: DISCONTINUED | OUTPATIENT
Start: 2022-12-07 | End: 2022-12-07 | Stop reason: SURG

## 2022-12-07 RX ORDER — OXYCODONE AND ACETAMINOPHEN 7.5; 325 MG/1; MG/1
1 TABLET ORAL EVERY 4 HOURS PRN
Status: DISCONTINUED | OUTPATIENT
Start: 2022-12-07 | End: 2022-12-07 | Stop reason: HOSPADM

## 2022-12-07 RX ORDER — HYDROMORPHONE HYDROCHLORIDE 1 MG/ML
0.5 INJECTION, SOLUTION INTRAMUSCULAR; INTRAVENOUS; SUBCUTANEOUS
Status: DISCONTINUED | OUTPATIENT
Start: 2022-12-07 | End: 2022-12-07 | Stop reason: HOSPADM

## 2022-12-07 RX ORDER — LABETALOL HYDROCHLORIDE 5 MG/ML
5 INJECTION, SOLUTION INTRAVENOUS
Status: DISCONTINUED | OUTPATIENT
Start: 2022-12-07 | End: 2022-12-07 | Stop reason: HOSPADM

## 2022-12-07 RX ORDER — ONDANSETRON 2 MG/ML
4 INJECTION INTRAMUSCULAR; INTRAVENOUS ONCE AS NEEDED
Status: DISCONTINUED | OUTPATIENT
Start: 2022-12-07 | End: 2022-12-07 | Stop reason: HOSPADM

## 2022-12-07 RX ORDER — SODIUM CHLORIDE, SODIUM LACTATE, POTASSIUM CHLORIDE, CALCIUM CHLORIDE 600; 310; 30; 20 MG/100ML; MG/100ML; MG/100ML; MG/100ML
9 INJECTION, SOLUTION INTRAVENOUS CONTINUOUS
Status: DISCONTINUED | OUTPATIENT
Start: 2022-12-07 | End: 2022-12-07

## 2022-12-07 RX ORDER — DIPHENHYDRAMINE HCL 25 MG
25 CAPSULE ORAL
Status: DISCONTINUED | OUTPATIENT
Start: 2022-12-07 | End: 2022-12-07 | Stop reason: HOSPADM

## 2022-12-07 RX ORDER — CEFAZOLIN SODIUM 2 G/100ML
2 INJECTION, SOLUTION INTRAVENOUS EVERY 8 HOURS
Status: COMPLETED | OUTPATIENT
Start: 2022-12-07 | End: 2022-12-08

## 2022-12-07 RX ORDER — NALOXONE HCL 0.4 MG/ML
0.2 VIAL (ML) INJECTION AS NEEDED
Status: DISCONTINUED | OUTPATIENT
Start: 2022-12-07 | End: 2022-12-07 | Stop reason: HOSPADM

## 2022-12-07 RX ORDER — FENTANYL CITRATE 50 UG/ML
50 INJECTION, SOLUTION INTRAMUSCULAR; INTRAVENOUS
Status: DISCONTINUED | OUTPATIENT
Start: 2022-12-07 | End: 2022-12-07 | Stop reason: HOSPADM

## 2022-12-07 RX ORDER — EPHEDRINE SULFATE 50 MG/ML
5 INJECTION, SOLUTION INTRAVENOUS ONCE AS NEEDED
Status: DISCONTINUED | OUTPATIENT
Start: 2022-12-07 | End: 2022-12-07 | Stop reason: HOSPADM

## 2022-12-07 RX ORDER — PROMETHAZINE HYDROCHLORIDE 25 MG/1
25 TABLET ORAL ONCE AS NEEDED
Status: DISCONTINUED | OUTPATIENT
Start: 2022-12-07 | End: 2022-12-07 | Stop reason: HOSPADM

## 2022-12-07 RX ORDER — ESTRADIOL 10 UG/1
10 INSERT VAGINAL
COMMUNITY
Start: 2022-09-01 | End: 2023-09-01

## 2022-12-07 RX ORDER — MIDAZOLAM HYDROCHLORIDE 1 MG/ML
1 INJECTION INTRAMUSCULAR; INTRAVENOUS
Status: DISCONTINUED | OUTPATIENT
Start: 2022-12-07 | End: 2022-12-07 | Stop reason: HOSPADM

## 2022-12-07 RX ORDER — POLYETHYLENE GLYCOL 3350 17 G/17G
17 POWDER, FOR SOLUTION ORAL DAILY
Status: DISCONTINUED | OUTPATIENT
Start: 2022-12-08 | End: 2022-12-09 | Stop reason: HOSPADM

## 2022-12-07 RX ORDER — PROPOFOL 10 MG/ML
VIAL (ML) INTRAVENOUS AS NEEDED
Status: DISCONTINUED | OUTPATIENT
Start: 2022-12-07 | End: 2022-12-07 | Stop reason: SURG

## 2022-12-07 RX ORDER — PROMETHAZINE HYDROCHLORIDE 25 MG/1
25 SUPPOSITORY RECTAL ONCE AS NEEDED
Status: DISCONTINUED | OUTPATIENT
Start: 2022-12-07 | End: 2022-12-07 | Stop reason: HOSPADM

## 2022-12-07 RX ORDER — LIDOCAINE HYDROCHLORIDE 10 MG/ML
0.5 INJECTION, SOLUTION EPIDURAL; INFILTRATION; INTRACAUDAL; PERINEURAL ONCE AS NEEDED
Status: DISCONTINUED | OUTPATIENT
Start: 2022-12-07 | End: 2022-12-07 | Stop reason: HOSPADM

## 2022-12-07 RX ORDER — HYDRALAZINE HYDROCHLORIDE 20 MG/ML
5 INJECTION INTRAMUSCULAR; INTRAVENOUS
Status: DISCONTINUED | OUTPATIENT
Start: 2022-12-07 | End: 2022-12-07 | Stop reason: HOSPADM

## 2022-12-07 RX ORDER — SODIUM CHLORIDE 0.9 % (FLUSH) 0.9 %
3 SYRINGE (ML) INJECTION EVERY 12 HOURS SCHEDULED
Status: DISCONTINUED | OUTPATIENT
Start: 2022-12-07 | End: 2022-12-07 | Stop reason: HOSPADM

## 2022-12-07 RX ORDER — TRANEXAMIC ACID 100 MG/ML
INJECTION, SOLUTION INTRAVENOUS AS NEEDED
Status: DISCONTINUED | OUTPATIENT
Start: 2022-12-07 | End: 2022-12-07 | Stop reason: SURG

## 2022-12-07 RX ADMIN — FENTANYL CITRATE 50 MCG: 0.05 INJECTION, SOLUTION INTRAMUSCULAR; INTRAVENOUS at 08:01

## 2022-12-07 RX ADMIN — HYDROCODONE BITARTRATE AND ACETAMINOPHEN 1 TABLET: 5; 325 TABLET ORAL at 16:50

## 2022-12-07 RX ADMIN — CYCLOBENZAPRINE 5 MG: 10 TABLET, FILM COATED ORAL at 22:59

## 2022-12-07 RX ADMIN — ROCURONIUM BROMIDE 30 MG: 10 INJECTION, SOLUTION INTRAVENOUS at 07:00

## 2022-12-07 RX ADMIN — HYDROCODONE BITARTRATE AND ACETAMINOPHEN 1 TABLET: 5; 325 TABLET ORAL at 12:09

## 2022-12-07 RX ADMIN — SODIUM CHLORIDE, POTASSIUM CHLORIDE, SODIUM LACTATE AND CALCIUM CHLORIDE 9 ML/HR: 600; 310; 30; 20 INJECTION, SOLUTION INTRAVENOUS at 06:25

## 2022-12-07 RX ADMIN — FENTANYL CITRATE 50 MCG: 0.05 INJECTION, SOLUTION INTRAMUSCULAR; INTRAVENOUS at 08:34

## 2022-12-07 RX ADMIN — HYDROMORPHONE HYDROCHLORIDE 0.5 MG: 1 INJECTION, SOLUTION INTRAMUSCULAR; INTRAVENOUS; SUBCUTANEOUS at 02:15

## 2022-12-07 RX ADMIN — CYCLOBENZAPRINE 5 MG: 10 TABLET, FILM COATED ORAL at 08:31

## 2022-12-07 RX ADMIN — ONDANSETRON 4 MG: 2 INJECTION INTRAMUSCULAR; INTRAVENOUS at 07:04

## 2022-12-07 RX ADMIN — FENTANYL CITRATE 50 MCG: 50 INJECTION, SOLUTION INTRAMUSCULAR; INTRAVENOUS at 07:00

## 2022-12-07 RX ADMIN — FENTANYL CITRATE 50 MCG: 0.05 INJECTION, SOLUTION INTRAMUSCULAR; INTRAVENOUS at 06:24

## 2022-12-07 RX ADMIN — HYDROMORPHONE HYDROCHLORIDE 0.5 MG: 1 INJECTION, SOLUTION INTRAMUSCULAR; INTRAVENOUS; SUBCUTANEOUS at 08:19

## 2022-12-07 RX ADMIN — CEFAZOLIN SODIUM 2 G: 2 INJECTION, SOLUTION INTRAVENOUS at 22:59

## 2022-12-07 RX ADMIN — ASPIRIN 81 MG: 81 TABLET, CHEWABLE ORAL at 20:55

## 2022-12-07 RX ADMIN — HYDROCODONE BITARTRATE AND ACETAMINOPHEN 1 TABLET: 7.5; 325 TABLET ORAL at 08:00

## 2022-12-07 RX ADMIN — PROPOFOL 200 MG: 10 INJECTION, EMULSION INTRAVENOUS at 07:00

## 2022-12-07 RX ADMIN — DEXAMETHASONE SODIUM PHOSPHATE 4 MG: 4 INJECTION, SOLUTION INTRAMUSCULAR; INTRAVENOUS at 07:04

## 2022-12-07 RX ADMIN — HYDROMORPHONE HYDROCHLORIDE 0.5 MG: 1 INJECTION, SOLUTION INTRAMUSCULAR; INTRAVENOUS; SUBCUTANEOUS at 04:15

## 2022-12-07 RX ADMIN — FENTANYL CITRATE 50 MCG: 0.05 INJECTION, SOLUTION INTRAMUSCULAR; INTRAVENOUS at 08:14

## 2022-12-07 RX ADMIN — CEFAZOLIN SODIUM 2 G: 2 INJECTION, SOLUTION INTRAVENOUS at 15:01

## 2022-12-07 RX ADMIN — ASPIRIN 81 MG: 81 TABLET, CHEWABLE ORAL at 12:09

## 2022-12-07 RX ADMIN — FENTANYL CITRATE 50 MCG: 0.05 INJECTION, SOLUTION INTRAMUSCULAR; INTRAVENOUS at 06:46

## 2022-12-07 RX ADMIN — HYDROCODONE BITARTRATE AND ACETAMINOPHEN 1 TABLET: 5; 325 TABLET ORAL at 20:54

## 2022-12-07 RX ADMIN — PHENYLEPHRINE HYDROCHLORIDE 100 MCG: 10 INJECTION, SOLUTION INTRAVENOUS at 07:12

## 2022-12-07 RX ADMIN — SUGAMMADEX 200 MG: 100 INJECTION, SOLUTION INTRAVENOUS at 07:35

## 2022-12-07 RX ADMIN — HYDROMORPHONE HYDROCHLORIDE 0.5 MG: 1 INJECTION, SOLUTION INTRAMUSCULAR; INTRAVENOUS; SUBCUTANEOUS at 08:08

## 2022-12-07 RX ADMIN — FENTANYL CITRATE 50 MCG: 0.05 INJECTION, SOLUTION INTRAMUSCULAR; INTRAVENOUS at 08:24

## 2022-12-07 RX ADMIN — DOCUSATE SODIUM 100 MG: 100 CAPSULE, LIQUID FILLED ORAL at 20:54

## 2022-12-07 RX ADMIN — MIDAZOLAM 1 MG: 1 INJECTION INTRAMUSCULAR; INTRAVENOUS at 06:46

## 2022-12-07 RX ADMIN — PHENYLEPHRINE HYDROCHLORIDE 200 MCG: 10 INJECTION, SOLUTION INTRAVENOUS at 07:04

## 2022-12-07 RX ADMIN — LIDOCAINE HYDROCHLORIDE 100 MG: 20 INJECTION, SOLUTION EPIDURAL; INFILTRATION; INTRACAUDAL; PERINEURAL at 07:00

## 2022-12-07 RX ADMIN — HYDROMORPHONE HYDROCHLORIDE 0.5 MG: 1 INJECTION, SOLUTION INTRAMUSCULAR; INTRAVENOUS; SUBCUTANEOUS at 08:39

## 2022-12-07 RX ADMIN — HYDROMORPHONE HYDROCHLORIDE 0.5 MG: 1 INJECTION, SOLUTION INTRAMUSCULAR; INTRAVENOUS; SUBCUTANEOUS at 08:29

## 2022-12-07 RX ADMIN — TRANEXAMIC ACID 1000 MG: 100 INJECTION INTRAVENOUS at 07:33

## 2022-12-07 RX ADMIN — SODIUM CHLORIDE 75 ML/HR: 9 INJECTION, SOLUTION INTRAVENOUS at 12:13

## 2022-12-07 RX ADMIN — CEFAZOLIN SODIUM 2 G: 2 INJECTION, SOLUTION INTRAVENOUS at 06:49

## 2022-12-07 NOTE — ED PROVIDER NOTES
The [facility] and I have discussed this patient's history, physical exam and treatment plan.  I provided a substantive portion of the care of this patient.  I have reviewed the documentation and personally had a face to face interaction with the patient and personally performed the physical exam, in its entirety.  I affirm the documentation and agree with the treatment and plan.  The following describes my personal findings.      The patient presents complaining of right hip pain after slip and fall while walking down a ramp while carrying headache.  Patient denies other injuries, hitting her head, loss of consciousness.  Patient reports she is not on blood thinners.    Comprehensive Physical exam:  Patient is nontoxic appearing oriented, conversant awake, alert  HEENT: normocephalic, atraumatic  Neck: No JVD no goiter, no pain with ROM  Pulmonary: Nontachypneic, breath sounds heard well bilaterally, chest wall nontender  cardiovascular: Nontachycardic  Abdomen: Soft, nontender  musculoskeletal: Right hip pain with minimal passive range of motion.  Distal ROM, pulse, sensation intact right lower extremity  Neuro/psychiatric:calm, appropriate, cooperative  Skin:warm, dry    Right hip fracture, admit for definitive care.    Patient was wearing facemask when I entered the room and throughout our encounter. Full protective equipment was worn throughout this patient encounter including a face mask, eye protection and gloves. Hand hygiene was performed before donning protective equipment and after removal when leaving the room.           Cyndy Daniels MD  12/06/22 8783

## 2022-12-07 NOTE — CONSULTS
Orthopaedic Surgery  Hip Fracture Consult Note  Dr. RENE Richey” Wei II  [phone]    HPI:  Patient is a 67 y.o. Not  or  female who presents with hip pain after a fall from standing.  They presented to the ER for further workup where a right Intertrochanteric Hip Fracture was found. I was consulted for further management.  She complains of sharp pains in the right hip worse with any activity.  She has been unable to mobilize since the injury.  Pain is better with bedrest and immobilization along with narcotic administration.    MEDICAL HISTORY  Past Medical History:   Diagnosis Date   • Anemia    • Arthritis     osteoarthritis lt knee   • Fracture, humerus 01/05/2019   • GERD (gastroesophageal reflux disease)    • Hypertension    ·   Past Surgical History:   Procedure Laterality Date   • APPENDECTOMY     • CATARACT EXTRACTION, BILATERAL Bilateral    • CHOLECYSTECTOMY     • ENDOMETRIAL ABLATION     • HYSTERECTOMY     • MOUTH SURGERY      benign mouth tumors x 2   • ORIF HUMERUS FRACTURE Right 01/10/2019    Procedure: Open Reduction and Internal Fixation Proximal Humerus;  Surgeon: Albert Rosales MD;  Location: [facility];  Service: Orthopedics   ·   Prior to Admission medications    Medication Sig Start Date End Date Taking? Authorizing Provider   Acetaminophen (TYLENOL ARTHRITIS PAIN PO) Take 650 mg by mouth.   Yes Provider, MD Melody   Cholecalciferol (VITAMIN D) 2000 units capsule Take 1 capsule by mouth.   Yes Provider, MD Melody   docusate sodium 100 MG capsule Take 100 mg by mouth 2 (Two) Times a Day. 1/11/19  Yes Sofiya Stauffer APRN   estradiol (VAGIFEM) 10 MCG tablet vaginal tablet Insert 10 mcg into the vagina. 9/1/22 9/1/23 Yes ProviderMelody MD   FEROSUL 325 (65 Fe) MG tablet TK 1 T PO D WITH BREAKFAST ON AN EMPTY STOMACH WITH SOMETHING ACIDIC 10/1/18  Yes ProviderMelody MD   lisinopril (PRINIVIL,ZESTRIL) 10 MG tablet Take  by mouth Daily. 12/18/18  Yes  "ProviderMelody MD   sertraline (ZOLOFT) 100 MG tablet Take 1 tablet by mouth Daily. 12/6/22  Yes ProviderMelody MD   cyclobenzaprine (FLEXERIL) 5 MG tablet Take 1 tablet by mouth 3 (Three) Times a Day As Needed for Muscle Spasms. 3/22/19 12/7/22  Sofiya Stauffer APRN   HYDROcodone-acetaminophen (NORCO) 5-325 MG per tablet Take 1 tablet by mouth Every 4 (Four) Hours As Needed for Moderate Pain . 2/25/19 12/7/22  Sofiya Stauffer APRN   pantoprazole (PROTONIX) 40 MG EC tablet Take 40 mg by mouth Daily. 12/18/18 12/7/22  ProviderMelody MD   ·   Allergies   Allergen Reactions   • Amlodipine Nausea Only     Also shaky and dizzy   • Morphine Nausea And Vomiting   ·   Most Recent Immunizations   Administered Date(s) Administered   • COVID-19 (PFIZER) PURPLE CAP 09/22/2021   • Covid-19 (Pfizer) Gray Cap 03/19/2022   • Tdap 06/03/2020   ·   Social History     Tobacco Use   • Smoking status: Never   • Smokeless tobacco: Never   Substance Use Topics   • Alcohol use: No   ·    Social History     Substance and Sexual Activity   Drug Use No   ·     VITALS: /61 (BP Location: Right arm, Patient Position: Lying)   Pulse 73   Temp 99 °F (37.2 °C) (Oral)   Resp 18   Ht 162.6 cm (64\")   Wt 72.6 kg (160 lb)   SpO2 100%   BMI 27.46 kg/m²  Body mass index is 27.46 kg/m².    PHYSICAL EXAM:   · CONSTITUTIONAL: No acute distress  · LUNGS: Equal chest rise, no shortness of air  · CARDIOVASCULAR: palpable peripheral pulses  · SKIN: no skin lesions in the area examined  · LYMPH: no lymphadenopathy in the area examined  · Right Lower Extremity  · Tenderness to Palpation: Tenderness to palpation at the hip  · Pulses:  Palpable DP/PT pulses  · Sensation: Sensation intact to light touch to saphenous/sural/deep peroneal/superficial peroneal/tibial nerves  · Motor: 5 out of 5 EHL/FHL/TA/GS motor complexes  · Range of Motion: Range of motion of hip deferred secondary to pain.  Positive pain with passive leg " [place]    RADIOLOGY REVIEW:   XR Chest 1 View    Result Date: 12/6/2022  No evidence for acute pulmonary process. Follow-up as clinical indications persist.  This report was finalized on 12/6/2022 2:25 PM by Dr. Harvinder Rodriguez M.D.      XR Hip With or Without Pelvis 2 - 3 View Right    Result Date: 12/6/2022   Right intertrochanteric fracture.  This report was finalized on 12/6/2022 2:24 PM by Dr. Harvinder Rodriguez M.D.        LABS:   Recent Results (from the past 24 hour(s))   Comprehensive Metabolic Panel    Collection Time: 12/06/22  1:26 PM    Specimen: Blood   Result Value Ref Range    Glucose 114 (H) 65 - 99 mg/dL    BUN 12 8 - 23 mg/dL    Creatinine 0.81 0.57 - 1.00 mg/dL    Sodium 136 136 - 145 mmol/L    Potassium 3.7 3.5 - 5.2 mmol/L    Chloride 101 98 - 107 mmol/L    CO2 23.8 22.0 - 29.0 mmol/L    Calcium 9.2 8.6 - 10.5 mg/dL    Total Protein 7.1 6.0 - 8.5 g/dL    Albumin 4.50 3.50 - 5.20 g/dL    ALT (SGPT) 11 1 - 33 U/L    AST (SGOT) 14 1 - 32 U/L    Alkaline Phosphatase 78 39 - 117 U/L    Total Bilirubin 0.3 0.0 - 1.2 mg/dL    Globulin 2.6 gm/dL    A/G Ratio 1.7 g/dL    BUN/Creatinine Ratio 14.8 7.0 - 25.0    Anion Gap 11.2 5.0 - 15.0 mmol/L    eGFR 80.7 >60.0 mL/min/1.73   CBC Auto Differential    Collection Time: 12/06/22  1:26 PM    Specimen: Blood   Result Value Ref Range    WBC 11.16 (H) 3.40 - 10.80 10*3/mm3    RBC 3.39 (L) 3.77 - 5.28 10*6/mm3    Hemoglobin 10.1 (L) 12.0 - 15.9 g/dL    Hematocrit 29.8 (L) 34.0 - 46.6 %    MCV 87.9 79.0 - 97.0 fL    MCH 29.8 26.6 - 33.0 pg    MCHC 33.9 31.5 - 35.7 g/dL    RDW 12.0 (L) 12.3 - 15.4 %    RDW-SD 38.8 37.0 - 54.0 fl    MPV 9.8 6.0 - 12.0 fL    Platelets 299 140 - 450 10*3/mm3    Neutrophil % 76.1 (H) 42.7 - 76.0 %    Lymphocyte % 15.3 (L) 19.6 - 45.3 %    Monocyte % 7.3 5.0 - 12.0 %    Eosinophil % 0.5 0.3 - 6.2 %    Basophil % 0.4 0.0 - 1.5 %    Immature Grans % 0.4 0.0 - 0.5 %    Neutrophils, Absolute 8.49 (H) 1.70 - 7.00 10*3/mm3    Lymphocytes,  Absolute 1.71 0.70 - 3.10 10*3/mm3    Monocytes, Absolute 0.81 0.10 - 0.90 10*3/mm3    Eosinophils, Absolute 0.06 0.00 - 0.40 10*3/mm3    Basophils, Absolute 0.04 0.00 - 0.20 10*3/mm3    Immature Grans, Absolute 0.05 0.00 - 0.05 10*3/mm3    nRBC 0.0 0.0 - 0.2 /100 WBC   ECG 12 Lead Pre-Op / Pre-Procedure    Collection Time: 12/06/22  2:10 PM   Result Value Ref Range    QT Interval 373 ms   Basic Metabolic Panel    Collection Time: 12/07/22  5:39 AM    Specimen: Blood   Result Value Ref Range    Glucose 114 (H) 65 - 99 mg/dL    BUN 18 8 - 23 mg/dL    Creatinine 0.63 0.57 - 1.00 mg/dL    Sodium 133 (L) 136 - 145 mmol/L    Potassium 4.0 3.5 - 5.2 mmol/L    Chloride 97 (L) 98 - 107 mmol/L    CO2 26.2 22.0 - 29.0 mmol/L    Calcium 8.7 8.6 - 10.5 mg/dL    BUN/Creatinine Ratio 28.6 (H) 7.0 - 25.0    Anion Gap 9.8 5.0 - 15.0 mmol/L    eGFR 98.6 >60.0 mL/min/1.73   CBC (No Diff)    Collection Time: 12/07/22  5:39 AM    Specimen: Blood   Result Value Ref Range    WBC 6.24 3.40 - 10.80 10*3/mm3    RBC 2.79 (L) 3.77 - 5.28 10*6/mm3    Hemoglobin 8.1 (L) 12.0 - 15.9 g/dL    Hematocrit 25.0 (L) 34.0 - 46.6 %    MCV 89.6 79.0 - 97.0 fL    MCH 29.0 26.6 - 33.0 pg    MCHC 32.4 31.5 - 35.7 g/dL    RDW 11.9 (L) 12.3 - 15.4 %    RDW-SD 38.4 37.0 - 54.0 fl    MPV 9.9 6.0 - 12.0 fL    Platelets 231 140 - 450 10*3/mm3       IMPRESSION:  Patient is a 67 y.o. Not  or  female with right Intertrochanteric Hip Fracture    PLAN:   · Admited to: Jenna Connor MD  · Diet: NPO Now  · Weight Bearing:Right Lower Extremity Non Weight Bearing until after surgery  · Labs: Type and screen  · Imaging: None additional needed  · Surgery: Intramedullary nailing for intertrochanteric hip fracture  · Hip Nail Consent: The risks and benefits of operative versus nonoperative treatment were discussed. They have elected to undergo operative treatment of the  injury. The general risks discussed included but were not limited to the risk of  "anesthesia, medical complications, infection, the need for further procedures, damage to neurovascular structures, blood clots, and continued pain.  No guarantees were made. Specifically, for this fracture I had a thorough discussion with the patient about the operative risks of intramedullary nailing.  These risks included nonunion/malunion, continued pain, advancement of arthritis and hardware irritation.  The patient and/or family wished to proceed with surgery.  The surgical consent was signed.  · Disposition: Surgery today for right hip repair    R \"Lawrence\" Wei ESPINOZA MD  Orthopaedic Surgery  [facility]  [phone] - [place] Office  [phone] - [place] Office          "

## 2022-12-07 NOTE — OP NOTE
Intertrochanteric/Subtrochanteric Fracture Operative Note  Dr. RENE Carvajal II  [phone]    PATIENT NAME: Drea Hill  MRN: [id number]  : 1955 AGE: 67 y.o. GENDER: female  DATE OF OPERATION: 2022  PREOPERATIVE DIAGNOSIS: Hip Fracture  POSTOPERATIVE DIAGNOSIS: Hip Fracture  OPERATION PERFORMED: Right Intramedullary Nailing of the Intertrochanteric Hip Fracture.  SURGEON: Dave Carvajal MD  Circulator: Vicky Troy RN  Scrub Person: Berto Ordoñez  Vendor Representative: Allyson Davis  Other: Elvia Lawson RN  ANESTHESIA: General  ASSISTANT: None   ESTIMATED BLOOD LOSS: Minimal  SPONGE AND NEEDLE COUNT: Correct  INDICATIONS: This patient was found to have an Intertrochanteric Hip Fracture after evaluation in the ER. An orthopaedic consult was placed for management. A discussion of operative versus nonoperative treatment was had with the patient and/or family. They elected to undergo intramedullary nailing of the hip fracture. The risks of surgery were discussed and included the risk of anesthesia, nonunion, malunion, infection, damage to neurovascular structures, implant loosening/fialure, fracture, hardware prominence, the need for further procedures, medical complications, and others. No guarantees were made. The patient wished to proceed with surgery and a surgical consent was signed.  COMPONENTS:   · [employer] InterTAN nail    PERTINENT FINDINGS: Hip Fracture    DETAILS OF PROCEDURE:   The patient was met in the preoperative area. The site was marked. The consent and H&P were reviewed. The patient was then wheeled back to the operative suite and underwent anesthesia. The [place] table boots were secured to the patients’ feet. The patient was moved onto the [place] table and secured in the supine position. The perineal post was inserted and the boots were secured into the leg holders. Surgical alcohol was used to thoroughly clean the operative area.    REDUCTION  Fluoroscopy  was used to visualize the fracture on both an AP and lateral.  Traction, rotation, flexion/extension, abduction/abduction was used as needed to adequately reduce the hip fracture.    The hip and leg were then prepped in the normal sterile fashion, which included Chloroprep and multiple layers of sterile drapes. A surgical timeout was performed in which administration of preoperative antibiotics and the surgical site were confirmed.    A small incision was made just proximal to the greater trochanter and a starting pin was drilled into the tip of the greater trochanter using fluoroscopy to confirm proper location. The opening reamer was then used to open the canal down to the lesser trochanter, visualizing propper position again using fluoroscopy.     OPEN REDUCTION  As fluoroscopic images on both an AP and lateral demonstrated adequate reduction of the fracture, no open techniques needed to be utilized for this case.     NAIL INSERTION  SHORT NAIL: A short nail was then inserted into the femur through the hole made by the opening reamer. The lag screw and compression screw were placed into the femoral head after being drilled and measured, again using fluoroscopy to place the screws in optimal position within the femoral head on both the AP and Lateral views, close to center/center position. Traction was released at this point and the fracture was compressed through the nail. Finally the short nail was secured with one distal interlocking screw which was placed using the jig assembly for the short nail. Final X-Rays showed the fracture reduction to have been maintained and the implant in optimal position.     The wounds were irrigated and closed in layers.  The wound was injected with an analgesic coctail.  A sterile dressing was then placed over the incisions. The patient was moved from the [place] table to the [facility] where the boots were removed. The patient was taken to the recovery room in stable condition. There  "were no complications and the patient tolerated the procedure well.    R \"Lawrence\" Wei ESPINOZA MD  Orthopaedic Surgery  [facility]  [phone]  "

## 2022-12-07 NOTE — PLAN OF CARE
Goal Outcome Evaluation:            VSS, pain managed with alternating dilaudid and Norco, IVF infusing, purewick in place, NPO at midnight, consent for surgery signed and in chart, presurgical wipe of skin complete, Family at bed side.

## 2022-12-07 NOTE — ANESTHESIA PROCEDURE NOTES
Airway  Urgency: elective    Date/Time: 12/7/2022 7:02 AM  Airway not difficult    General Information and Staff    Patient location during procedure: OR  CRNA/CAA: Meg Ahn, CRNA    Indications and Patient Condition  Indications for airway management: airway protection    Preoxygenated: yes  Mask difficulty assessment: 2 - vent by mask + OA or adjuvant +/- NMBA    Final Airway Details  Final airway type: endotracheal airway      Successful airway: ETT  Cuffed: yes   Successful intubation technique: direct laryngoscopy  Endotracheal tube insertion site: oral  Blade: Wes  Blade size: 3  ETT size (mm): 7.0  Cormack-Lehane Classification: grade I - full view of glottis  Placement verified by: chest auscultation and capnometry   Cuff volume (mL): 8  Measured from: lips  ETT/EBT  to lips (cm): 19  Number of attempts at approach: 1    Additional Comments  Atraumatic placement of ETT, dentition unchanged from preop.

## 2022-12-07 NOTE — H&P
HISTORY AND PHYSICAL   [facility]        Date of Admission: 2022  Patient Identification:  Name: Drea Hill  Age: 67 y.o.  Sex: female  :  1955  MRN: [id number]                     Primary Care Physician: Lilia Turner APRN    Chief Complaint:  67 year old female who fell earlier today; she was working outside and slipped because the ground was wet; she immediately had pain of her right hip and was unable to bear weight; she has no history of heart disease    History of Present Illness:   As above    Past Medical History:  Past Medical History:   Diagnosis Date   • Anemia    • Arthritis     osteoarthritis lt knee   • Fracture, humerus 2019   • GERD (gastroesophageal reflux disease)    • Hypertension      Past Surgical History:  Past Surgical History:   Procedure Laterality Date   • APPENDECTOMY     • CHOLECYSTECTOMY     • MOUTH SURGERY      benign mouth tumors x 2   • ORIF HUMERUS FRACTURE Right 1/10/2019    Procedure: Open Reduction and Internal Fixation Proximal Humerus;  Surgeon: Albert Rosales MD;  Location: [facility];  Service: Orthopedics      Home Meds:  Medications Prior to Admission   Medication Sig Dispense Refill Last Dose   • Cholecalciferol (VITAMIN D) 2000 units capsule Take 1 capsule by mouth.      • cyclobenzaprine (FLEXERIL) 5 MG tablet Take 1 tablet by mouth 3 (Three) Times a Day As Needed for Muscle Spasms. 30 tablet 0    • docusate sodium 100 MG capsule Take 100 mg by mouth 2 (Two) Times a Day.      • FEROSUL 325 (65 Fe) MG tablet TK 1 T PO D WITH BREAKFAST ON AN EMPTY STOMACH WITH SOMETHING ACIDIC  0    • HYDROcodone-acetaminophen (NORCO) 5-325 MG per tablet Take 1 tablet by mouth Every 4 (Four) Hours As Needed for Moderate Pain . 50 tablet 0    • lisinopril (PRINIVIL,ZESTRIL) 10 MG tablet Take  by mouth Daily.  5    • pantoprazole (PROTONIX) 40 MG EC tablet Take 40 mg by mouth Daily.  5        Allergies:  Allergies   Allergen Reactions   •  "Morphine Nausea And Vomiting     Immunizations:  Immunization History   Administered Date(s) Administered   • COVID-19 (PFIZER) PURPLE CAP 2021, 2021   • Covid-19 (Pfizer) Gray Cap 2022     Social History:   Social History     Social History Narrative   • Not on file     Social History     Socioeconomic History   • Marital status:    Tobacco Use   • Smoking status: Never   • Smokeless tobacco: Never   Substance and Sexual Activity   • Alcohol use: No   • Drug use: No   • Sexual activity: Defer       Family History:  History reviewed. No pertinent family history.     Review of Systems  See history of present illness and past medical history.  Patient denies headache, dizziness, syncope, falls, trauma, change in vision, change in hearing, change in taste, changes in weight, changes in appetite, focal weakness, numbness, or paresthesia.  Patient denies chest pain, palpitations, dyspnea, orthopnea, PND, cough, sinus pressure, rhinorrhea, epistaxis, hemoptysis, nausea, vomiting,hematemesis, diarrhea, constipation or hematchezia.  Denies cold or heat intolerance, polydipsia, polyuria, polyphagia. Denies hematuria, pyuria, dysuria, hesitancy, frequency or urgency. Denies consumption of raw and under cooked meats foods or change in water source.  Denies fever, chills, sweats, night sweats.  Denies missing any routine medications. Remainder of ROS is negative.    Objective:  T Max 24 hrs: Temp (24hrs), Av.3 °F (36.8 °C), Min:97.6 °F (36.4 °C), Max:99 °F (37.2 °C)    Vitals Ranges:   Temp:  [97.6 °F (36.4 °C)-99 °F (37.2 °C)] 97.6 °F (36.4 °C)  Heart Rate:  [68-90] 68  Resp:  [16-18] 16  BP: (133-143)/(63-79) 133/72      Exam:  /72 (BP Location: Right arm, Patient Position: Lying)   Pulse 68   Temp 97.6 °F (36.4 °C) (Oral)   Resp 16   Ht 162.6 cm (64\")   Wt 72.6 kg (160 lb)   SpO2 97%   BMI 27.46 kg/m²     General Appearance:    Alert, cooperative, no distress, appears stated age "   Head:    Normocephalic, without obvious abnormality, atraumatic   Eyes:    PERRL, conjunctivae/corneas clear, EOM's intact, both eyes   Ears:    Normal external ear canals, both ears   Nose:   Nares normal, septum midline, mucosa normal, no drainage    or sinus tenderness   Throat:   Lips, mucosa, and tongue normal   Neck:   Supple, symmetrical, trachea midline, no adenopathy;     thyroid:  no enlargement/tenderness/nodules; no carotid    bruit or JVD   Back:     Symmetric, no curvature, ROM normal, no CVA tenderness   Lungs:     Clear to auscultation bilaterally, respirations unlabored   Chest Wall:    No tenderness or deformity    Heart:    Regular rate and rhythm, S1 and S2 normal, no murmur, rub   or gallop   Abdomen:     Soft, nontender, bowel sounds active all four quadrants,     no masses, no hepatomegaly, no splenomegaly   Extremities:   Extremities normal, atraumatic, no cyanosis or edema   Pulses:   2+ and symmetric all extremities   Skin:   Skin color, texture, turgor normal, no rashes or lesions   Lymph nodes:   Cervical, supraclavicular, and axillary nodes normal   Neurologic:   CNII-XII intact, normal strength, sensation intact throughout      .    Data Review:  Labs in chart were reviewed.  WBC   Date Value Ref Range Status   12/06/2022 11.16 (H) 3.40 - 10.80 10*3/mm3 Final     Hemoglobin   Date Value Ref Range Status   12/06/2022 10.1 (L) 12.0 - 15.9 g/dL Final     Hematocrit   Date Value Ref Range Status   12/06/2022 29.8 (L) 34.0 - 46.6 % Final     Platelets   Date Value Ref Range Status   12/06/2022 299 140 - 450 10*3/mm3 Final     Sodium   Date Value Ref Range Status   12/06/2022 136 136 - 145 mmol/L Final     Potassium   Date Value Ref Range Status   12/06/2022 3.7 3.5 - 5.2 mmol/L Final     Chloride   Date Value Ref Range Status   12/06/2022 101 98 - 107 mmol/L Final     CO2   Date Value Ref Range Status   12/06/2022 23.8 22.0 - 29.0 mmol/L Final     BUN   Date Value Ref Range Status    12/06/2022 12 8 - 23 mg/dL Final     Creatinine   Date Value Ref Range Status   12/06/2022 0.81 0.57 - 1.00 mg/dL Final     Glucose   Date Value Ref Range Status   12/06/2022 114 (H) 65 - 99 mg/dL Final     Calcium   Date Value Ref Range Status   12/06/2022 9.2 8.6 - 10.5 mg/dL Final     AST (SGOT)   Date Value Ref Range Status   12/06/2022 14 1 - 32 U/L Final     ALT (SGPT)   Date Value Ref Range Status   12/06/2022 11 1 - 33 U/L Final     Alkaline Phosphatase   Date Value Ref Range Status   12/06/2022 78 39 - 117 U/L Final                Imaging Results (All)     Procedure Component Value Units Date/Time    XR Chest 1 View [[id number]] Collected: 12/06/22 1424     Updated: 12/06/22 1428    Narrative:      XR CHEST 1 VW-     HISTORY: Female who is 67 years-old,  preoperative evaluation     TECHNIQUE: Frontal view of the chest     COMPARISON: None available     FINDINGS: Heart size is normal. Aorta is calcified. Pulmonary  vasculature is unremarkable. No focal pulmonary consolidation, pleural  effusion, or pneumothorax. Old granulomatous disease is apparent. No  acute osseous process.       Impression:      No evidence for acute pulmonary process. Follow-up as  clinical indications persist.     This report was finalized on 12/6/2022 2:25 PM by Dr. Harvinder Rodriguez M.D.       XR Hip With or Without Pelvis 2 - 3 View Right [[id number]] Collected: 12/06/22 1423     Updated: 12/06/22 1427    Narrative:      XR HIP W OR WO PELVIS 2-3 VIEW RIGHT-     INDICATIONS: Trauma     TECHNIQUE: Frontal view the pelvis, 3 views of the right hip     COMPARISON: None available     FINDINGS:     Angulated, comminuted right intertrochanteric fracture is noted. No  other fractures are identified. No dislocation. Degenerative changes  seen at the stenosis pubis and partly included lumbar spine.       Impression:         Right intertrochanteric fracture.     This report was finalized on 12/6/2022 2:24 PM by Dr. Harvinder ENGLE  RUSTAM Rodriguez               Assessment:  Active Hospital Problems    Diagnosis  POA   • **Closed fracture of right hip, initial encounter ([facility]) [S72.001A]  Yes      Resolved Hospital Problems   No resolved problems to display.   hypertension  gerd  Anemia  hyperglycemia    Plan:  Surgery will be needed  Npo after midnight  Pain control  Trend hgb and blood sugar  D.w patient and ED provider    Jenna Connor MD  12/6/2022  19:42 EST

## 2022-12-07 NOTE — ANESTHESIA POSTPROCEDURE EVALUATION
Patient: Drea Hill    Procedure Summary     Date: 12/07/22 Room / Location: [facility] OR [address] [place] [address] MAIN OR    Anesthesia Start: 0654 Anesthesia Stop: 0752    Procedure: HIP INTERTROCHANTERIC NAILING (Right: Thigh) Diagnosis:       Closed fracture of right hip, initial encounter ([facility])      (Closed fracture of right hip, initial encounter ([facility]) [S72.001A])    Surgeons: Dave Carvajal II, MD Provider: Donavon Willingham MD    Anesthesia Type: general ASA Status: 2          Anesthesia Type: general    Vitals  Vitals Value Taken Time   /67 12/07/22 0826   Temp 36.6 °C (97.9 °F) 12/07/22 0748   Pulse 75 12/07/22 0838   Resp 16 12/07/22 0825   SpO2 100 % 12/07/22 0838   Vitals shown include unvalidated device data.        Post Anesthesia Care and Evaluation    Patient location during evaluation: bedside  Patient participation: complete - patient participated  Level of consciousness: awake and alert  Pain management: adequate    Airway patency: patent  Anesthetic complications: No anesthetic complications  PONV Status: controlled  Cardiovascular status: blood pressure returned to baseline and acceptable  Respiratory status: acceptable  Hydration status: acceptable

## 2022-12-08 PROBLEM — I10 ESSENTIAL HYPERTENSION: Status: ACTIVE | Noted: 2022-12-08

## 2022-12-08 PROBLEM — D50.9 IRON DEFICIENCY ANEMIA: Status: ACTIVE | Noted: 2022-12-08

## 2022-12-08 PROBLEM — D62 ACUTE BLOOD LOSS ANEMIA: Status: ACTIVE | Noted: 2022-12-08

## 2022-12-08 PROBLEM — K21.9 GERD WITHOUT ESOPHAGITIS: Status: ACTIVE | Noted: 2022-12-08

## 2022-12-08 PROBLEM — D51.9 VITAMIN B12 DEFICIENCY ANEMIA: Status: ACTIVE | Noted: 2022-12-08

## 2022-12-08 LAB
ANION GAP SERPL CALCULATED.3IONS-SCNC: 7.6 MMOL/L (ref 5–15)
BASOPHILS # BLD AUTO: 0.01 10*3/MM3 (ref 0–0.2)
BASOPHILS NFR BLD AUTO: 0.2 % (ref 0–1.5)
BUN SERPL-MCNC: 12 MG/DL (ref 8–23)
BUN/CREAT SERPL: 18.5 (ref 7–25)
CALCIUM SPEC-SCNC: 8.3 MG/DL (ref 8.6–10.5)
CHLORIDE SERPL-SCNC: 101 MMOL/L (ref 98–107)
CO2 SERPL-SCNC: 25.4 MMOL/L (ref 22–29)
CREAT SERPL-MCNC: 0.65 MG/DL (ref 0.57–1)
DEPRECATED RDW RBC AUTO: 39.3 FL (ref 37–54)
EGFRCR SERPLBLD CKD-EPI 2021: 97.8 ML/MIN/1.73
EOSINOPHIL # BLD AUTO: 0.04 10*3/MM3 (ref 0–0.4)
EOSINOPHIL NFR BLD AUTO: 0.7 % (ref 0.3–6.2)
ERYTHROCYTE [DISTWIDTH] IN BLOOD BY AUTOMATED COUNT: 12.1 % (ref 12.3–15.4)
GLUCOSE SERPL-MCNC: 112 MG/DL (ref 65–99)
HBA1C MFR BLD: 5 % (ref 4.8–5.6)
HCT VFR BLD AUTO: 21.4 % (ref 34–46.6)
HGB BLD-MCNC: 7.1 G/DL (ref 12–15.9)
IMM GRANULOCYTES # BLD AUTO: 0.02 10*3/MM3 (ref 0–0.05)
IMM GRANULOCYTES NFR BLD AUTO: 0.4 % (ref 0–0.5)
LYMPHOCYTES # BLD AUTO: 1.61 10*3/MM3 (ref 0.7–3.1)
LYMPHOCYTES NFR BLD AUTO: 29.3 % (ref 19.6–45.3)
MCH RBC QN AUTO: 29.8 PG (ref 26.6–33)
MCHC RBC AUTO-ENTMCNC: 33.2 G/DL (ref 31.5–35.7)
MCV RBC AUTO: 89.9 FL (ref 79–97)
MONOCYTES # BLD AUTO: 0.75 10*3/MM3 (ref 0.1–0.9)
MONOCYTES NFR BLD AUTO: 13.7 % (ref 5–12)
NEUTROPHILS NFR BLD AUTO: 3.06 10*3/MM3 (ref 1.7–7)
NEUTROPHILS NFR BLD AUTO: 55.7 % (ref 42.7–76)
NRBC BLD AUTO-RTO: 0 /100 WBC (ref 0–0.2)
PLATELET # BLD AUTO: 181 10*3/MM3 (ref 140–450)
PMV BLD AUTO: 10.3 FL (ref 6–12)
POTASSIUM SERPL-SCNC: 3.8 MMOL/L (ref 3.5–5.2)
RBC # BLD AUTO: 2.38 10*6/MM3 (ref 3.77–5.28)
SODIUM SERPL-SCNC: 134 MMOL/L (ref 136–145)
WBC NRBC COR # BLD: 5.49 10*3/MM3 (ref 3.4–10.8)

## 2022-12-08 PROCEDURE — 25010000002 NA FERRIC GLUC CPLX PER 12.5 MG: Performed by: INTERNAL MEDICINE

## 2022-12-08 PROCEDURE — 63710000001 DIPHENHYDRAMINE PER 50 MG: Performed by: INTERNAL MEDICINE

## 2022-12-08 PROCEDURE — 36430 TRANSFUSION BLD/BLD COMPNT: CPT

## 2022-12-08 PROCEDURE — P9016 RBC LEUKOCYTES REDUCED: HCPCS

## 2022-12-08 PROCEDURE — 25010000002 CEFAZOLIN IN DEXTROSE 2-4 GM/100ML-% SOLUTION: Performed by: ORTHOPAEDIC SURGERY

## 2022-12-08 PROCEDURE — 25010000002 HYDROMORPHONE PER 4 MG: Performed by: ORTHOPAEDIC SURGERY

## 2022-12-08 PROCEDURE — 86900 BLOOD TYPING SEROLOGIC ABO: CPT

## 2022-12-08 PROCEDURE — 25010000002 CYANOCOBALAMIN PER 1000 MCG: Performed by: INTERNAL MEDICINE

## 2022-12-08 PROCEDURE — 97110 THERAPEUTIC EXERCISES: CPT

## 2022-12-08 PROCEDURE — 80048 BASIC METABOLIC PNL TOTAL CA: CPT | Performed by: INTERNAL MEDICINE

## 2022-12-08 PROCEDURE — 97162 PT EVAL MOD COMPLEX 30 MIN: CPT

## 2022-12-08 PROCEDURE — 85025 COMPLETE CBC W/AUTO DIFF WBC: CPT | Performed by: INTERNAL MEDICINE

## 2022-12-08 RX ORDER — CYANOCOBALAMIN 1000 UG/ML
1000 INJECTION, SOLUTION INTRAMUSCULAR; SUBCUTANEOUS DAILY
Status: DISCONTINUED | OUTPATIENT
Start: 2022-12-08 | End: 2022-12-09 | Stop reason: HOSPADM

## 2022-12-08 RX ORDER — DIPHENHYDRAMINE HCL 25 MG
50 CAPSULE ORAL ONCE
Status: DISCONTINUED | OUTPATIENT
Start: 2022-12-08 | End: 2022-12-08 | Stop reason: CLARIF

## 2022-12-08 RX ORDER — DIPHENHYDRAMINE HCL 50 MG
50 CAPSULE ORAL ONCE
Status: DISCONTINUED | OUTPATIENT
Start: 2022-12-08 | End: 2022-12-08

## 2022-12-08 RX ORDER — CHOLECALCIFEROL (VITAMIN D3) 125 MCG
1000 CAPSULE ORAL DAILY
Status: DISCONTINUED | OUTPATIENT
Start: 2022-12-08 | End: 2022-12-09 | Stop reason: HOSPADM

## 2022-12-08 RX ORDER — ACETAMINOPHEN 325 MG/1
650 TABLET ORAL ONCE
Status: DISCONTINUED | OUTPATIENT
Start: 2022-12-08 | End: 2022-12-08 | Stop reason: CLARIF

## 2022-12-08 RX ORDER — HYDROCODONE BITARTRATE AND ACETAMINOPHEN 7.5; 325 MG/1; MG/1
1 TABLET ORAL EVERY 6 HOURS PRN
Status: DISCONTINUED | OUTPATIENT
Start: 2022-12-08 | End: 2022-12-09 | Stop reason: HOSPADM

## 2022-12-08 RX ORDER — ACETAMINOPHEN 325 MG/1
650 TABLET ORAL ONCE
Status: DISCONTINUED | OUTPATIENT
Start: 2022-12-08 | End: 2022-12-08

## 2022-12-08 RX ORDER — ACETAMINOPHEN 325 MG/1
650 TABLET ORAL ONCE
Status: COMPLETED | OUTPATIENT
Start: 2022-12-08 | End: 2022-12-08

## 2022-12-08 RX ORDER — DIPHENHYDRAMINE HCL 50 MG
50 CAPSULE ORAL ONCE
Status: COMPLETED | OUTPATIENT
Start: 2022-12-08 | End: 2022-12-08

## 2022-12-08 RX ADMIN — CYANOCOBALAMIN 1000 MCG: 1000 INJECTION, SOLUTION INTRAMUSCULAR; SUBCUTANEOUS at 20:56

## 2022-12-08 RX ADMIN — DIPHENHYDRAMINE HCL 50 MG: 50 CAPSULE ORAL at 22:35

## 2022-12-08 RX ADMIN — SODIUM CHLORIDE 250 MG: 9 INJECTION, SOLUTION INTRAVENOUS at 23:08

## 2022-12-08 RX ADMIN — HYDROCODONE BITARTRATE AND ACETAMINOPHEN 1 TABLET: 5; 325 TABLET ORAL at 06:05

## 2022-12-08 RX ADMIN — HYDROCODONE BITARTRATE AND ACETAMINOPHEN 1 TABLET: 5; 325 TABLET ORAL at 01:29

## 2022-12-08 RX ADMIN — FERROUS SULFATE TAB 325 MG (65 MG ELEMENTAL FE) 325 MG: 325 (65 FE) TAB at 09:55

## 2022-12-08 RX ADMIN — SODIUM CHLORIDE 75 ML/HR: 9 INJECTION, SOLUTION INTRAVENOUS at 01:29

## 2022-12-08 RX ADMIN — ACETAMINOPHEN 650 MG: 325 TABLET, FILM COATED ORAL at 22:35

## 2022-12-08 RX ADMIN — Medication 1000 MCG: at 20:56

## 2022-12-08 RX ADMIN — LISINOPRIL 10 MG: 10 TABLET ORAL at 09:55

## 2022-12-08 RX ADMIN — HYDROMORPHONE HYDROCHLORIDE 0.5 MG: 1 INJECTION, SOLUTION INTRAMUSCULAR; INTRAVENOUS; SUBCUTANEOUS at 16:33

## 2022-12-08 RX ADMIN — PANTOPRAZOLE SODIUM 40 MG: 40 TABLET, DELAYED RELEASE ORAL at 10:17

## 2022-12-08 RX ADMIN — HYDROCODONE BITARTRATE AND ACETAMINOPHEN 1 TABLET: 7.5; 325 TABLET ORAL at 19:22

## 2022-12-08 RX ADMIN — HYDROMORPHONE HYDROCHLORIDE 0.5 MG: 1 INJECTION, SOLUTION INTRAMUSCULAR; INTRAVENOUS; SUBCUTANEOUS at 09:55

## 2022-12-08 RX ADMIN — HYDROCODONE BITARTRATE AND ACETAMINOPHEN 1 TABLET: 5; 325 TABLET ORAL at 14:58

## 2022-12-08 RX ADMIN — Medication 2000 UNITS: at 09:55

## 2022-12-08 RX ADMIN — CEFAZOLIN SODIUM 2 G: 2 INJECTION, SOLUTION INTRAVENOUS at 06:06

## 2022-12-08 NOTE — PLAN OF CARE
Goal Outcome Evaluation:  Plan of Care Reviewed With: patient        Progress: no change  Outcome Evaluation: A&O. RA. IVF per order. Purewick in place. Needing stool sample. PRN pain medications per orders. Up with 1 with walker/gait belt. Family at bedside. WCTM

## 2022-12-08 NOTE — PLAN OF CARE
Goal Outcome Evaluation:  Plan of Care Reviewed With: patient           Outcome Evaluation: Pt 66 yo female POD 1 R hip IMN s/p fx, fall, WBAT. Pt baseline independent, cares for spouse thats WC bound. Pt has handicap accessible home, ramp entrance. Pt reports she has family assist and all approrpiate DME (rwx, [employer]). On PT exam pt ambulated 35ft CGA rwx altered gait mechanics improved w cueing. Pt may benefit from skilled PT acutely to address functional deficits, rec HHC at [place].

## 2022-12-08 NOTE — THERAPY EVALUATION
Acute Care - Physical Therapy Initial Evaluation  [facility]     Patient Name: Drea Hill  : 1955  MRN: [id number]  Today's Date: 2022   Onset of Illness/Injury or Date of Surgery: 22  Visit Dx:     ICD-10-CM ICD-9-CM   1. Closed fracture of right hip, initial encounter ([facility])  S72.001A 820.8   2. Generalized weakness  R53.1 780.79     Patient Active Problem List   Diagnosis   • Closed displaced fracture of surgical neck of right humerus   • Closed fracture of upper end of humerus   • Closed fracture of right hip, initial encounter ([facility])     Past Medical History:   Diagnosis Date   • Anemia    • Arthritis     osteoarthritis lt knee   • Fracture, humerus 2019   • GERD (gastroesophageal reflux disease)    • Hypertension      Past Surgical History:   Procedure Laterality Date   • APPENDECTOMY     • CATARACT EXTRACTION, BILATERAL Bilateral    • CHOLECYSTECTOMY     • ENDOMETRIAL ABLATION     • HIP INTERTROCHANTERIC NAILING Right 2022    Procedure: HIP INTERTROCHANTERIC NAILING;  Surgeon: Dave Carvajal II, MD;  Location: [facility];  Service: Orthopedics;  Laterality: Right;   • HYSTERECTOMY     • MOUTH SURGERY      benign mouth tumors x 2   • ORIF HUMERUS FRACTURE Right 01/10/2019    Procedure: Open Reduction and Internal Fixation Proximal Humerus;  Surgeon: Albert Rosales MD;  Location: [facility];  Service: Orthopedics     PT Assessment (last 12 hours)     PT Evaluation and Treatment     Row Name 22 1100          Physical Therapy Time and Intention    Subjective Information no complaints  -     Document Type evaluation  -     Mode of Treatment individual therapy;physical therapy  -     Patient Effort excellent  -     Symptoms Noted During/After Treatment none  -     Row Name 22 1100          General Information    Patient Profile Reviewed yes  -     Onset of Illness/Injury or Date of Surgery 22  -     Patient Observations  alert;cooperative;agree to therapy  -     General Observations of Patient pt reclined in chair no acute distress  -     Prior Level of Function independent:  -     Pertinent History of Current Functional Problem POD 1 R hip IMN  -     Existing Precautions/Restrictions fall;hip;right  -LH     Risks Reviewed patient and family:  -     Benefits Reviewed patient and family:  -     Barriers to Rehab none identified  -     Row Name 12/08/22 1100          Living Environment    Current Living Arrangements home  -     People in Home spouse  DIL  -     Row Name 12/08/22 1100          Pain    Pretreatment Pain Rating 6/10  -     Posttreatment Pain Rating 5/10  -     Pain Location - Side/Orientation Right  -     Pain Location incisional  -     Pain Intervention(s) Cold applied;Repositioned;Ambulation/increased activity  -     Row Name 12/08/22 1100          Cognition    Affect/Mental Status (Cognition) WFL  -     Orientation Status (Cognition) oriented x 4  -     Row Name 12/08/22 1100          Range of Motion Comprehensive    Comment, General Range of Motion LEs WFL except R hip  -     Row Name 12/08/22 1100          Strength Comprehensive (MMT)    Comment, General Manual Muscle Testing (MMT) Assessment LEs WFL except R hip  -     Row Name 12/08/22 1100          Bed Mobility    Bed Mobility sit-supine;supine-sit  -     Comment, (Bed Mobility) NT in chair  -     Row Name 12/08/22 1100          Transfers    Transfers stand-sit transfer;sit-stand transfer  -     Row Name 12/08/22 1100          Sit-Stand Transfer    Sit-Stand [place] (Transfers) standby assist;verbal cues  -     Assistive Device (Sit-Stand Transfers) walker, front-wheeled  -     Row Name 12/08/22 1100          Stand-Sit Transfer    Stand-Sit [place] (Transfers) standby assist  -     Assistive Device (Stand-Sit Transfers) walker, front-wheeled  -     Row Name 12/08/22 1100          Gait/Stairs (Locomotion)     [place] Level (Gait) standby assist;contact guard  -     Assistive Device (Gait) walker, front-wheeled  -     Distance in Feet (Gait) 35  -LH     Pattern (Gait) step-to  -LH     Deviations/Abnormal Patterns (Gait) quiana decreased  -     Right Sided Gait Deviations --  inc knee flexion, VCs for inc heel strike and knee ext in stance  -     Row Name 12/08/22 1100          Motor Skills    Therapeutic Exercise --  APs, quad sets, glute sets x 10  -     Row Name             Wound 12/07/22 0720 Right anterior greater trochanter Incision    Wound - Properties Group Placement Date: 12/07/22  -RW Placement Time: 0720  -RW Present on Hospital Admission: Y  -RW Side: Right  -RW Orientation: anterior  -RW Location: greater trochanter  -RW Primary Wound Type: Incision  -RW    Retired Wound - Properties Group Placement Date: 12/07/22  -RW Placement Time: 0720  -RW Present on Hospital Admission: Y  -RW Side: Right  -RW Orientation: anterior  -RW Location: greater trochanter  -RW Primary Wound Type: Incision  -RW    Retired Wound - Properties Group Date first assessed: 12/07/22  -RW Time first assessed: 0720  -RW Present on Hospital Admission: Y  -RW Side: Right  -RW Location: greater trochanter  -RW Primary Wound Type: Incision  -RW    Row Name 12/08/22 1100          Plan of Care Review    Plan of Care Reviewed With patient  -     Outcome Evaluation Pt 66 yo female POD 1 R hip IMN s/p fx, fall, WBAT. Pt baseline independent, cares for spouse thats WC bound. Pt has handicap accessible home, ramp entrance. Pt reports she has family assist and all approrpiate DME (rwx, [employer]). On PT exam pt ambulated 35ft CGA rwx altered gait mechanics improved w cueing. Pt may benefit from skilled PT acutely to address functional deficits, rec C at [facility].  -     Row Name 12/08/22 1100          Positioning and Restraints    Pre-Treatment Position sitting in chair/recliner  -     Post Treatment Position chair  -     In Chair  reclined;call light within reach;encouraged to call for assist;exit alarm on;with family/caregiver  -     Row Name 12/08/22 1100          Therapy Assessment/Plan (PT)    Rehab Potential (PT) good, to achieve stated therapy goals  -     Criteria for Skilled Interventions Met (PT) yes  -     Therapy Frequency (PT) 6 times/wk  -     Row Name 12/08/22 1100          PT Evaluation Complexity    History, PT Evaluation Complexity 1-2 personal factors and/or comorbidities  -     Examination of Body Systems (PT Eval Complexity) total of 3 or more elements  -     Clinical Presentation (PT Evaluation Complexity) evolving  -     Clinical Decision Making (PT Evaluation Complexity) moderate complexity  -     Overall Complexity (PT Evaluation Complexity) moderate complexity  -     Row Name 12/08/22 1100          Physical Therapy Goals    Bed Mobility Goal Selection (PT) bed mobility, PT goal 1  -     Transfer Goal Selection (PT) transfer, PT goal 1  -     Gait Training Goal Selection (PT) gait training, PT goal 1  -     Row Name 12/08/22 1100          Bed Mobility Goal 1 (PT)    Activity/Assistive Device (Bed Mobility Goal 1, PT) bed mobility activities, all  -     [place] Level/Cues Needed (Bed Mobility Goal 1, PT) modified independence  -     Time Frame (Bed Mobility Goal 1, PT) 1 week  -     Row Name 12/08/22 1100          Transfer Goal 1 (PT)    Activity/Assistive Device (Transfer Goal 1, PT) sit-to-stand/stand-to-sit;bed-to-chair/chair-to-bed;walker, rolling  -     [place] Level/Cues Needed (Transfer Goal 1, PT) modified independence  -     Time Frame (Transfer Goal 1, PT) 1 week  -     Row Name 12/08/22 1100          Gait Training Goal 1 (PT)    Activity/Assistive Device (Gait Training Goal 1, PT) assistive device use;walker, rolling  -     [place] Level (Gait Training Goal 1, PT) modified independence  -     Distance (Gait Training Goal 1, PT) 80  -LH     Time Frame  (Gait Training Goal 1, PT) 1 week  -           User Key  (r) = Recorded By, (t) = Taken By, (c) = Cosigned By    Initials Name Provider Type     Yessenia Knowles, PT Physical Therapist    Vicky Hwang RN Registered Nurse                Physical Therapy Education     Title: PT OT SLP Therapies (In Progress)     Topic: Physical Therapy (In Progress)     Point: Mobility training (In Progress)     Learning Progress Summary           Patient Acceptance, E, NR by  at 12/8/2022 1132                   Point: Home exercise program (In Progress)     Learning Progress Summary           Patient Acceptance, E, NR by  at 12/8/2022 1132                   Point: Body mechanics (In Progress)     Learning Progress Summary           Patient Acceptance, E, NR by  at 12/8/2022 1132                   Point: Precautions (In Progress)     Learning Progress Summary           Patient Acceptance, E, NR by  at 12/8/2022 1132                               User Key     Initials Effective Dates Name Provider Type Discipline     06/16/21 -  Yessenia Knowles, PT Physical Therapist PT              PT Recommendation and Plan  Anticipated Discharge Disposition (PT): home with 24/7 care, home with home health  Planned Therapy Interventions (PT): balance training, bed mobility training, gait training, home exercise program, stair training, ROM (range of motion), strengthening, stretching, transfer training  Therapy Frequency (PT): 6 times/wk  Plan of Care Reviewed With: patient  Outcome Evaluation: Pt 66 yo female POD 1 R hip IMN s/p fx, fall, WBAT. Pt baseline independent, cares for spouse thats WC bound. Pt has handicap accessible home, ramp entrance. Pt reports she has family assist and all approrpiate DME (rwjorge alberto, [employer]). On PT exam pt ambulated 35ft CGA rwx altered gait mechanics improved w cueing. Pt may benefit from skilled PT acutely to address functional deficits, rec C at [place].   Outcome Measures     Row Name 12/08/22 1100              How much help from another person do you currently need...    Turning from your back to your side while in flat bed without using bedrails? 3  -LH      Moving from lying on back to sitting on the side of a flat bed without bedrails? 3  -LH      Moving to and from a bed to a chair (including a wheelchair)? 3  -LH      Standing up from a chair using your arms (e.g., wheelchair, bedside chair)? 3  -LH      Climbing 3-5 steps with a railing? 2  -LH      To walk in hospital room? 3  -      AM-PAC 6 Clicks Score (PT) 17  -         Functional Assessment    Outcome Measure Options AM-PAC 6 Clicks Basic Mobility (PT)  -            User Key  (r) = Recorded By, (t) = Taken By, (c) = Cosigned By    Initials Name Provider Type     Yessenia Knowles, PT Physical Therapist                 Time Calculation:    PT Charges     Row Name 12/08/22 1134             Time Calculation    Start Time 0933  -      Stop Time 0945  -      Time Calculation (min) 12 min  -      PT Received On 12/08/22  -      PT - Next Appointment 12/09/22  -      PT Goal Re-Cert Due Date 12/15/22  -         Time Calculation- PT    Total Timed Code Minutes- PT 8 minute(s)  -            User Key  (r) = Recorded By, (t) = Taken By, (c) = Cosigned By    Initials Name Provider Type     Yessenia Knowles, PT Physical Therapist              Therapy Charges for Today     Code Description Service Date Service Provider Modifiers Qty    [id number]  PT EVAL MOD COMPLEXITY 2 12/8/2022 Yessenia Knowles, PT GP 1    [id number]  PT THER PROC EA 15 MIN 12/8/2022 Yessenia Knowles, PT GP 1          PT G-Codes  Outcome Measure Options: AM-PAC 6 Clicks Basic Mobility (PT)  AM-PAC 6 Clicks Score (PT): 17    Yessenia Knowles PT  12/8/2022

## 2022-12-08 NOTE — PROGRESS NOTES
Name: Drea Hill ADMIT: 2022   : 1955  PCP: Lilia Turner APRN    MRN: [id number] LOS: 1 days   AGE/SEX: 67 y.o. female  ROOM: [facility]     Subjective   Subjective   Right hip pain is controlled.  No lower extremity numbness or weakness.  Positive low-grade fever but no chills.  S/p right hip fracture open reduction internal fixation earlier    Review of Systems  Cardiovascular/respiratory.  No chest pain/no shortness of breath/no cough/no wheeze/no hemoptysis.  .  No dysuria or hematuria.  Able to urinate postsurgery.  GI.  No abdominal pain or nausea or vomiting.  No bowel movement since admission.       Objective   Objective   Vital Signs  Temp:  [97 °F (36.1 °C)-99.4 °F (37.4 °C)] 99.4 °F (37.4 °C)  Heart Rate:  [68-84] 80  Resp:  [16-18] 16  BP: (114-160)/(61-75) 147/71  SpO2:  [94 %-100 %] 94 %  on  Flow (L/min):  [2] 2;   Device (Oxygen Therapy): room air    Intake/Output Summary (Last 24 hours) at 2022  Last data filed at 2022 1930  Gross per 24 hour   Intake 1540 ml   Output 1225 ml   Net 315 ml     Body mass index is 27.46 kg/m².      22  1310 22  0606   Weight: 72.6 kg (160 lb) 72.6 kg (160 lb)     Physical Exam  General.    Middle-aged female.  Alert and oriented x3.  In no apparent pain/distress/diaphoresis.  Normal mood and affect.    Eyes.  The pupils equal round and reactive.  Intact extraocular musculature.  No pallor or jaundice.  Oral cavity.  Moist mucous membrane.  Neck.  Supple.  No JVD.  No lymphadenopathy or thyromegaly.  Cardiovascular.  Regular rate and rhythm with no gallops or murmurs.  Chest.  Clear to auscultation bilaterally with no added sounds.  Abdomen.  Soft lax.  No tenderness.  No organomegaly.  No guarding or rebound.  Extremities.  Dressed right hip wound laterally.  No lower extremity neuro deficits.  CNS.  No acute focal neurological deficits.    Results Review:      Results from last 7 days   Lab Units 22  0533  12/06/22  1326   SODIUM mmol/L 133* 136   POTASSIUM mmol/L 4.0 3.7   CHLORIDE mmol/L 97* 101   CO2 mmol/L 26.2 23.8   BUN mg/dL 18 12   CREATININE mg/dL 0.63 0.81   GLUCOSE mg/dL 114* 114*   CALCIUM mg/dL 8.7 9.2   AST (SGOT) U/L  --  14   ALT (SGPT) U/L  --  11     Estimated Creatinine Clearance: 87 mL/min (by C-G formula based on SCr of 0.63 mg/dL).                            Invalid input(s):  PHOS        Invalid input(s): LDLCALC  Results from last 7 days   Lab Units 12/07/22  0539 12/06/22  1326   WBC 10*3/mm3 6.24 11.16*   HEMOGLOBIN g/dL 8.1* 10.1*   HEMATOCRIT % 25.0* 29.8*   PLATELETS 10*3/mm3 231 299   MCV fL 89.6 87.9   MCH pg 29.0 29.8   MCHC g/dL 32.4 33.9   RDW % 11.9* 12.0*   RDW-SD fl 38.4 38.8   MPV fL 9.9 9.8   NEUTROPHIL % %  --  76.1*   LYMPHOCYTE % %  --  15.3*   MONOCYTES % %  --  7.3   EOSINOPHIL % %  --  0.5   BASOPHIL % %  --  0.4   IMM GRAN % %  --  0.4   NEUTROS ABS 10*3/mm3  --  8.49*   LYMPHS ABS 10*3/mm3  --  1.71   MONOS ABS 10*3/mm3  --  0.81   EOS ABS 10*3/mm3  --  0.06   BASOS ABS 10*3/mm3  --  0.04   IMMATURE GRANS (ABS) 10*3/mm3  --  0.05   NRBC /100 WBC  --  0.0                                           Imaging:  Imaging Results (Last 24 Hours)     Procedure Component Value Units Date/Time    XR Hip With or Without Pelvis 2 - 3 View Right [[id number]] Collected: 12/07/22 0856     Updated: 12/07/22 0932    Narrative:      TWO VIEW RIGHT HIP IN THE OR      HISTORY: Internal fixation of intertrochanteric fracture     Imaging in the operating room was performed at the time of internal  fixation of an intertrochanteric fracture with intramedullary neema and  intersecting screws and the final image shows satisfactory alignment. 40  images were obtained and the fluoroscopy time measures 30 seconds.     This report was finalized on 12/7/2022 9:25 AM by Dr. Henry Recio M.D.       XR Hip With or Without Pelvis 1 View Right [[id number]] Collected: 12/07/22 0852     Updated: 12/07/22 0856     Narrative:      ONE VIEW PORTABLE RIGHT HIP AND ONE VIEW PORTABLE AP PELVIS     HISTORY: Internal fixation of fracture     FINDINGS: The postoperative imaging shows internal fixation of a right  intertrochanteric fracture with satisfactory alignment. There is  associated avulsion and slight medial displacement of the lesser  trochanter.     This report was finalized on 12/7/2022 8:52 AM by Dr. Henry Recio M.D.       FL C Arm During Surgery [[id number]] Resulted: 12/07/22 0746     Updated: 12/07/22 0746    Narrative:      This procedure was auto-finalized with no dictation required.             I reviewed the patient's new clinical results / labs / tests / procedures      Assessment/Plan     Active Hospital Problems    Diagnosis  POA   • **Closed fracture of right hip, initial encounter ([facility]) [S72.001A]  Yes      Resolved Hospital Problems   No resolved problems to display.           · Mechanical fall leading to intertrochanteric right hip fracture status post intramedullary nailing today.  On perioperative cefazolin/aspirin for DVT prophylaxis/Norco, Dilaudid as needed/incentive spirometry.  · Post operative low grade fever.  Mostly lung atelectasis.  Counseled about incentive spirometry.  Will monitor.  No leukocytosis.  · Hypertension.  Good blood pressure control with no evidence of angina or congestive heart failure.  Continue lisinopril.  · GERD.  Asymptomatic.  Benign GI examination.  Continue proton pump inhibitor.  · Anemia.  Patient with a history of anemia with a baseline hemoglobin between 8 and 10.  Positive postoperative anemia.  Will monitor.  We will work-up the anemia.  · Hyperglycemia.  Will check A1c.  · DVT prophylaxis.  Sequential compression devices and aspirin.    Discussed my findings and plan of treatment with the patient.  Disposition.  Hopefully home with physical therapy.  Awaiting physical postoperative evaluation.        Robert Smith MD  [facility]  12/07/22  20:22 EST

## 2022-12-08 NOTE — PROGRESS NOTES
Name: Drea Hill ADMIT: 2022   : 1955  PCP: Lilia Turner APRN    MRN: [id number] LOS: 2 days   AGE/SEX: 67 y.o. female  ROOM: [facility]     Subjective   Subjective   Right hip pain is worse today..  No lower extremity numbness or weakness.  No fever or chills.      Review of Systems  Cardiovascular/respiratory.  No chest pain/no shortness of breath/no cough/no wheeze/no hemoptysis.  .  No dysuria or hematuria.  Normal urine stream  GI.  No abdominal pain or nausea or vomiting.  Normal bowel movement without constipation/diarrhea/bleeding per rectum/melena.       Objective   Objective   Vital Signs  Temp:  [97.4 °F (36.3 °C)-98.8 °F (37.1 °C)] 98.7 °F (37.1 °C)  Heart Rate:  [72-80] 75  Resp:  [16-18] 16  BP: (132-155)/(66-80) 132/70  SpO2:  [96 %-99 %] 99 %  on   ;   Device (Oxygen Therapy): room air    Intake/Output Summary (Last 24 hours) at 2022 1714  Last data filed at 2022 0900  Gross per 24 hour   Intake 240 ml   Output 2100 ml   Net -1860 ml     Body mass index is 27.46 kg/m².      22  1310 22  0606   Weight: 72.6 kg (160 lb) 72.6 kg (160 lb)     Physical Exam    General.    Middle-aged female.  Alert and oriented x3.  In no apparent pain/distress/diaphoresis.  Normal mood and affect.    Eyes.  The pupils equal round and reactive.  Intact extraocular musculature.  No pallor or jaundice.  Oral cavity.  Moist mucous membrane.  Neck.  Supple.  No JVD.  No lymphadenopathy or thyromegaly.  Cardiovascular.  Regular rate and rhythm with no gallops or murmurs.  Chest.  Clear to auscultation bilaterally with no added sounds.  Abdomen.  Soft lax.  No tenderness.  No organomegaly.  No guarding or rebound.  Extremities.  Dressed right hip wound laterally.  No lower extremity neuro deficits.  CNS.  No acute focal neurological deficits.    Exam not changed from yesterday.      Results Review:      Results from last 7 days   Lab Units 22  0531 22  0539 22  1326    SODIUM mmol/L 134* 133* 136   POTASSIUM mmol/L 3.8 4.0 3.7   CHLORIDE mmol/L 101 97* 101   CO2 mmol/L 25.4 26.2 23.8   BUN mg/dL 12 18 12   CREATININE mg/dL 0.65 0.63 0.81   GLUCOSE mg/dL 112* 114* 114*   CALCIUM mg/dL 8.3* 8.7 9.2   AST (SGOT) U/L  --   --  14   ALT (SGPT) U/L  --   --  11     Estimated Creatinine Clearance: 84.3 mL/min (by C-G formula based on SCr of 0.65 mg/dL).  Results from last 7 days   Lab Units 12/07/22  2033   HEMOGLOBIN A1C % 5.00                           Invalid input(s):  PHOS        Invalid input(s): LDLCALC  Results from last 7 days   Lab Units 12/08/22  0531 12/07/22  0539 12/06/22  1326   WBC 10*3/mm3 5.49 6.24 11.16*   HEMOGLOBIN g/dL 7.1* 8.1* 10.1*   HEMATOCRIT % 21.4* 25.0* 29.8*   PLATELETS 10*3/mm3 181 231 299   MCV fL 89.9 89.6 87.9   MCH pg 29.8 29.0 29.8   MCHC g/dL 33.2 32.4 33.9   RDW % 12.1* 11.9* 12.0*   RDW-SD fl 39.3 38.4 38.8   MPV fL 10.3 9.9 9.8   NEUTROPHIL % % 55.7  --  76.1*   LYMPHOCYTE % % 29.3  --  15.3*   MONOCYTES % % 13.7*  --  7.3   EOSINOPHIL % % 0.7  --  0.5   BASOPHIL % % 0.2  --  0.4   IMM GRAN % % 0.4  --  0.4   NEUTROS ABS 10*3/mm3 3.06  --  8.49*   LYMPHS ABS 10*3/mm3 1.61  --  1.71   MONOS ABS 10*3/mm3 0.75  --  0.81   EOS ABS 10*3/mm3 0.04  --  0.06   BASOS ABS 10*3/mm3 0.01  --  0.04   IMMATURE GRANS (ABS) 10*3/mm3 0.02  --  0.05   NRBC /100 WBC 0.0  --  0.0                                           Imaging:  Imaging Results (Last 24 Hours)     ** No results found for the last 24 hours. **             I reviewed the patient's new clinical results / labs / tests / procedures      Assessment/Plan     Active Hospital Problems    Diagnosis  POA   • **Closed fracture of right hip, initial encounter ([facility]) [S72.001A]  Yes   • Acute blood loss anemia [D62]  No   • Iron deficiency anemia [D50.9]  Yes   • Vitamin B12 deficiency anemia [D51.9]  Yes   • Essential hypertension [I10]  Yes   • GERD without esophagitis [K21.9]  Yes      Resolved Hospital  Problems   No resolved problems to display.           · Mechanical fall leading to intertrochanteric right hip fracture status post intramedullary nailing today.  S/p perioperative cefazolin period on aspirin for DVT prophylaxis.  Will increase Norco secondary to uncontrolled pain.  Continue Dilaudid as needed.  Continue  · Post operative low grade fever.  Mostly lung atelectasis.  Resolved.  Continue incentive spirometry.    No leukocytosis.  · Hypertension.  Good blood pressure control with no evidence of angina or congestive heart failure.  Continue lisinopril.  · GERD.  Asymptomatic.  Benign GI examination.  Continue proton pump inhibitor.  · Iron deficiency anemia/vitamin B12 deficiency anemia/acute blood loss anemia.  Patient with a history of anemia with a baseline hemoglobin between 8 and 10.  Positive hemoglobin drop to below 8.  Will transfuse.  We will give an infusion of IV iron and will continue p.o. iron.  Will give IM vitamin B12, check intrinsic factor and initiate p.o. vitamin B12.  Will need follow-up on the anemia as an outpatient.    · Hyperglycemia.  Normal A1c.  · DVT prophylaxis.  Sequential compression devices and aspirin.    Discussed my findings and plan of treatment with the patient.  Disposition.  Hopefully home tomorrow with home health physical therapy if hemoglobin improves.  Will need a walker with wheels      Robert Smith MD  [facility]  12/08/22  17:14 EST

## 2022-12-08 NOTE — PROGRESS NOTES
Continued Stay Note  [facility]     Patient Name: Drea Hill  MRN: [id number]  Today's Date: 12/8/2022    Admit Date: 12/6/2022    Plan: [facility]/family support   Discharge Plan     Row Name 12/08/22 1456       Plan    Plan [facility]/family support    Patient/Family in Agreement with Plan yes    Plan Comments Spoke with pt, verified correct information on facesheet and explained the role of CCP. Pt would like to d/c home with [facility], referral sent in [facility] to [facility]. Plan will be to d/c home with [facility] and family support. No other needs identified.               Discharge Codes    No documentation.                     Billie Leigh RN

## 2022-12-08 NOTE — DISCHARGE PLACEMENT REQUEST
"Arben Ellington (67 y.o. Female)     Date of Birth   1955    Social Security Number       Address   [address] [place] [address]    Home Phone   [phone]    MRN   [id number]       [place]   Jewish    Marital Status                               Admission Date   12/6/22    Admission Type   Emergency    Admitting Provider   Jenna Connor MD    Attending Provider   Robert Smith MD    Department, Room/Bed   [address], P790/1       Discharge Date       Discharge Disposition       Discharge Destination                               Attending Provider: Robert Smith MD    Allergies: Amlodipine, Morphine    Isolation: None   Infection: None   Code Status: CPR    Ht: 162.6 cm (64\")   Wt: 72.6 kg (160 lb)    Admission Cmt: None   Principal Problem: Closed fracture of right hip, initial encounter ([facility]) [S72.001A]                 Active Insurance as of 12/6/2022     Primary Coverage     Payor Plan Insurance Group Employer/Plan Group    MEDICARE MEDICARE A & B      Payor Plan Address Payor Plan Phone Number Payor Plan Fax Number Effective Dates    [address] [phone]  8/1/2022 - None Entered    [place] [id number]       Subscriber Name Subscriber Birth Date Member ID       ARBEN ELLINGTON 1955 [id number]                 Emergency Contacts      (Rel.) Home Phone Work Phone Mobile Phone    RAKELRAMSEY (Spouse) [phone] -- [phone]    RAKELGONSALO (Son) [phone] -- --            "

## 2022-12-09 ENCOUNTER — HOME HEALTH ADMISSION (OUTPATIENT)
Dept: HOME HEALTH SERVICES | Facility: HOME HEALTHCARE | Age: 67
End: 2022-12-09

## 2022-12-09 VITALS
TEMPERATURE: 98.3 F | SYSTOLIC BLOOD PRESSURE: 125 MMHG | HEIGHT: 64 IN | DIASTOLIC BLOOD PRESSURE: 69 MMHG | HEART RATE: 75 BPM | OXYGEN SATURATION: 95 % | RESPIRATION RATE: 16 BRPM | WEIGHT: 160 LBS | BODY MASS INDEX: 27.31 KG/M2

## 2022-12-09 LAB
ANION GAP SERPL CALCULATED.3IONS-SCNC: 8.3 MMOL/L (ref 5–15)
BASOPHILS # BLD AUTO: 0.02 10*3/MM3 (ref 0–0.2)
BASOPHILS NFR BLD AUTO: 0.4 % (ref 0–1.5)
BH BB BLOOD EXPIRATION DATE: NORMAL
BH BB BLOOD TYPE BARCODE: 6200
BH BB DISPENSE STATUS: NORMAL
BH BB PRODUCT CODE: NORMAL
BH BB UNIT NUMBER: NORMAL
BUN SERPL-MCNC: 15 MG/DL (ref 8–23)
BUN/CREAT SERPL: 25.9 (ref 7–25)
CALCIUM SPEC-SCNC: 8.5 MG/DL (ref 8.6–10.5)
CHLORIDE SERPL-SCNC: 101 MMOL/L (ref 98–107)
CO2 SERPL-SCNC: 25.7 MMOL/L (ref 22–29)
CREAT SERPL-MCNC: 0.58 MG/DL (ref 0.57–1)
CROSSMATCH INTERPRETATION: NORMAL
DEPRECATED RDW RBC AUTO: 43.1 FL (ref 37–54)
EGFRCR SERPLBLD CKD-EPI 2021: 100.6 ML/MIN/1.73
EOSINOPHIL # BLD AUTO: 0.17 10*3/MM3 (ref 0–0.4)
EOSINOPHIL NFR BLD AUTO: 3.5 % (ref 0.3–6.2)
ERYTHROCYTE [DISTWIDTH] IN BLOOD BY AUTOMATED COUNT: 13.6 % (ref 12.3–15.4)
GLUCOSE SERPL-MCNC: 107 MG/DL (ref 65–99)
HCT VFR BLD AUTO: 23.2 % (ref 34–46.6)
HGB BLD-MCNC: 7.7 G/DL (ref 12–15.9)
IMM GRANULOCYTES # BLD AUTO: 0.02 10*3/MM3 (ref 0–0.05)
IMM GRANULOCYTES NFR BLD AUTO: 0.4 % (ref 0–0.5)
LYMPHOCYTES # BLD AUTO: 1.09 10*3/MM3 (ref 0.7–3.1)
LYMPHOCYTES NFR BLD AUTO: 22.4 % (ref 19.6–45.3)
MCH RBC QN AUTO: 29.2 PG (ref 26.6–33)
MCHC RBC AUTO-ENTMCNC: 33.2 G/DL (ref 31.5–35.7)
MCV RBC AUTO: 87.9 FL (ref 79–97)
MONOCYTES # BLD AUTO: 0.56 10*3/MM3 (ref 0.1–0.9)
MONOCYTES NFR BLD AUTO: 11.5 % (ref 5–12)
NEUTROPHILS NFR BLD AUTO: 3.01 10*3/MM3 (ref 1.7–7)
NEUTROPHILS NFR BLD AUTO: 61.8 % (ref 42.7–76)
NRBC BLD AUTO-RTO: 0.2 /100 WBC (ref 0–0.2)
PLATELET # BLD AUTO: 157 10*3/MM3 (ref 140–450)
PMV BLD AUTO: 10.2 FL (ref 6–12)
POTASSIUM SERPL-SCNC: 3.7 MMOL/L (ref 3.5–5.2)
RBC # BLD AUTO: 2.64 10*6/MM3 (ref 3.77–5.28)
SODIUM SERPL-SCNC: 135 MMOL/L (ref 136–145)
UNIT  ABO: NORMAL
UNIT  RH: NORMAL
WBC NRBC COR # BLD: 4.87 10*3/MM3 (ref 3.4–10.8)

## 2022-12-09 PROCEDURE — 86340 INTRINSIC FACTOR ANTIBODY: CPT | Performed by: INTERNAL MEDICINE

## 2022-12-09 PROCEDURE — 85025 COMPLETE CBC W/AUTO DIFF WBC: CPT | Performed by: INTERNAL MEDICINE

## 2022-12-09 PROCEDURE — 80048 BASIC METABOLIC PNL TOTAL CA: CPT | Performed by: INTERNAL MEDICINE

## 2022-12-09 RX ORDER — CYCLOBENZAPRINE HCL 5 MG
5 TABLET ORAL 3 TIMES DAILY PRN
Qty: 30 TABLET | Refills: 0 | Status: SHIPPED | OUTPATIENT
Start: 2022-12-09

## 2022-12-09 RX ORDER — HYDROCODONE BITARTRATE AND ACETAMINOPHEN 7.5; 325 MG/1; MG/1
1 TABLET ORAL EVERY 6 HOURS PRN
Qty: 12 TABLET | Refills: 0 | Status: SHIPPED | OUTPATIENT
Start: 2022-12-09 | End: 2022-12-15

## 2022-12-09 RX ORDER — ACETAMINOPHEN 325 MG/1
650 TABLET ORAL EVERY 4 HOURS PRN
Qty: 120 TABLET | Refills: 0 | Status: SHIPPED | OUTPATIENT
Start: 2022-12-09

## 2022-12-09 RX ORDER — ASPIRIN 81 MG/1
81 TABLET, CHEWABLE ORAL 2 TIMES DAILY
Qty: 60 TABLET | Refills: 0 | Status: SHIPPED | OUTPATIENT
Start: 2022-12-09

## 2022-12-09 RX ORDER — PANTOPRAZOLE SODIUM 40 MG/1
40 TABLET, DELAYED RELEASE ORAL DAILY
Qty: 30 TABLET | Refills: 3 | Status: SHIPPED | OUTPATIENT
Start: 2022-12-09

## 2022-12-09 RX ADMIN — ASPIRIN 81 MG: 81 TABLET, CHEWABLE ORAL at 09:30

## 2022-12-09 RX ADMIN — HYDROCODONE BITARTRATE AND ACETAMINOPHEN 1 TABLET: 7.5; 325 TABLET ORAL at 03:23

## 2022-12-09 RX ADMIN — Medication 2000 UNITS: at 09:30

## 2022-12-09 RX ADMIN — Medication 1000 MCG: at 09:30

## 2022-12-09 RX ADMIN — FERROUS SULFATE TAB 325 MG (65 MG ELEMENTAL FE) 325 MG: 325 (65 FE) TAB at 09:33

## 2022-12-09 RX ADMIN — PANTOPRAZOLE SODIUM 40 MG: 40 TABLET, DELAYED RELEASE ORAL at 09:36

## 2022-12-09 RX ADMIN — LISINOPRIL 10 MG: 10 TABLET ORAL at 09:30

## 2022-12-09 RX ADMIN — HYDROCODONE BITARTRATE AND ACETAMINOPHEN 1 TABLET: 7.5; 325 TABLET ORAL at 09:29

## 2022-12-09 NOTE — PROGRESS NOTES
Continued Stay Note  [facility]     Patient Name: Drea Hill  MRN: [id number]  Today's Date: 12/9/2022    Admit Date: 12/6/2022    Plan: [facility]/family support   Discharge Plan     Row Name 12/09/22 1516       Plan    Final Discharge Disposition Code 06 - home with home health care    Final Note [facility]               Discharge Codes    No documentation.               Expected Discharge Date and Time     Expected Discharge Date Expected Discharge Time    Dec 9, 2022             Billie Leigh RN

## 2022-12-09 NOTE — PLAN OF CARE
Goal Outcome Evaluation:  Patient is a very pleasant woman. Her daughter is at bedside. She had a right hip nailing on 12/6/2022. Patient received one unit of PRBC and Iron. Patient could possibly be discharged home with [facility] later today if her hemoglobin has improved. VSS. Call light is within reach. Will continue to monitor.

## 2022-12-09 NOTE — PROGRESS NOTES
[facility] following for home care needs.  Noted order for home PT however orders will be signed by Dr Lawrence Carvajal.  Verified all info and note spelling of street was incorrect...[id number] is correct.  Call patient's mobile first to schedule.  D/Cing today.

## 2022-12-09 NOTE — DISCHARGE INSTRUCTIONS
[phone]    INCISION CARE  Wash your hands prior to dressing changes  The postoperative dressings should be changed starting on postoperative day #2. This will be started by nursing in the hospital. New dry dressings can then be changed daily.   No creams or ointments to the incisions until 4 weeks post op.  May remove dressing once the incision is completely free of drainage. But need to wait at least a week after surgery.  Do not touch or pick at the incision  Check incision every day and notify surgeon immediately if any of the following signs or symptoms are seen:  Increase in redness  Increase in swelling around the incision and of the entire extremity  Increase in pain  NEW drainage or oozing from the incision  Pulling apart of the edges of the incision  Increase in overall body temperature (greater than 100.4 degrees)  Your surgeon will instruct you regarding suture or staple removal. In general, this happens at 2-week post op. If you are discharged to an SNF/SNU facility, they can and should remove your staples at 14 days.     ACTIVITIES  Exercises:  Physical therapy will begin immediately while in the hospital. Patients going to a nursing home will get therapy as part of their care at the SNU/SNF facility. Patients going home may also have a therapist come to the house to help them mobilize until they can safely get to an outpatient therapy facility.   Elevate the affected leg most of the day during the first week post operatively. Caution must be taken to avoid pillow placement directly under the heel of the leg, as this can cause pressure ulcers even with a soft pillow. All pillows and blankets should be placed underneath of the thigh and calf so that the heel is free-floating.  Use cold packs for 20-30 minutes approximately 5 times per day.  You should perform the daily stretching and strengthening exercises as taught by the therapist as often as possible. This can be done many times a day.   Full  weight bearing is allowed after surgery. It will be sore/painful to put weight on the leg, but this will help the bone to heal and prevent complications such as pneumonia, bed sores and blood clots. Mobilization is vital to the recovery process.   Activities of Daily Living:  No tub baths, hot tubs, or swimming pools for 4 weeks.   May shower and let water run over the incision on postoperative day #10 if there is no drainage and the wound is well healing. A new dry dressing can be applied after showering.    Restrictions  Weight: It is ok to allow full weight bearing after surgery. Weight on the leg actually stimulates the bone to heal and quickens the recovery process. While it will be sore/painful to put weight on the leg, it is safe to do so.   Driving: Many patients have questions about when it is safe to return to driving. The answer is that this is extremely variable. It depends on the extent of the surgery, as well as how quickly you heal. Certainly left leg surgeries make returning to driving easier while right leg surgeries require more extensive rehabilitation before driving can be safe. Until you can press down on the brake hard, and are off of all narcotics, driving is not permitted. Your surgeon cannot “clear” you to return to driving, only you can make the decision when you feel it is safe.    Medications  Anticoagulants: After upper extremity surgery most patients do not require an anticoagulant unless you have another injury that will be keeping you from mobilizing. Lower extremity surgery typically does require use of an anticoagulation medicine.   IF YOU HAD LOWER EXTREMITY SURGERY AND ARE NOT DISCHARGED HOME WITH ANY ANTICOAGULANT MEDICINE YOU SHOULD TAKE ASPIRIN 325mg DAILY FOR 30 DAYS POSTOPERATIVELY.  If you are discharged home with an anticoagulant such as Aspirin, Xarelto, Eliquis, Coumadin, or Lovenox, follow these simple instructions:   Notify surgeon immediately if any yolanda bleeding is  noted in the urine, stool, emesis, or from the nose or the incision. Blood in the stool will often appear as black rather than red. Blood in urine may appear as pink. Blood in emesis may appear as brown/black like coffee grounds.  You will need to apply pressure for longer periods of time to any cuts or abrasions to stop bleeding  Avoid alcohol while taking anticoagulants  Most anticoagulants are to be taken for 30 days postoperatively. After this time, you may stop using them unless instructed otherwise.   If you were already taking an anticoagulant (commonly Aspirin, Coumadin, or Plavix) you will likely be resuming your normal dose postoperatively and will be continuing that medication at the discretion of the prescribing physician.  Stool Softeners: You will be at greater risk of constipation after surgery due to being less mobile and the pain medications.  Take stool softeners as needed. Over the counter Colace 100 mg 1-2 capsules twice daily can be taken.  If stools become too loose or too frequent, please decreases the dosage or stop the stool softener.  If constipation occurs despite use of stool softeners, you are to continue the stool softeners and add a laxative (Milk of Magnesia 1 ounce daily as needed)  Drink plenty of fluids, and eat fruits and vegetables during your recovery time. Getting up and mobilizing will help the bowels to recover their regular function, as will weaning off of all narcotics when the pain becomes tolerable.  Pain Medications utilized after surgery are narcotics. This is some general information about these medications.  CLASSIFICATION: Pain medications are called Opioids and are narcotics  LEGALITIES: It is illegal to share narcotics with others  DRIVING: it is illegal to drive while under the influence of narcotics. Doing so is a DUI.  POTENTIAL SIDE EFFECTS: nausea, vomiting, itching, dizziness, drowsiness, dry mouth, constipation, and difficulty urinating.  POTENTIAL ADVERSE  EFFECTS:  Opioid tolerance can develop with use of pain medications and this simply means that it requires more and more of the medication to control pain. However, this is seen more in patients that use opioids for longer periods of time.  Opioid dependence can develop with use of Opioids. People with opioid dependence will experience withdrawal symptoms upon cessation of the medication.  Opioid addiction can develop with use of Opioids. The incidence of this is very unlikely in patients who take the medications as ordered and stop the medications as instructed.  Opioid overdose can be dangerous, but is unlikely when the medication is taken as ordered and stopped when ordered. It is important not to mix opioids with alcohol as this can lead to over sedation and respiratory difficulty.  DOSAGE:  After the initial surgical pain begins to resolve, you may begin to decrease the pain medication. By the end of a few weeks, you should be off of pain medications.  Refills will not be given by the office during evening hours, on weekends, or after 6 weeks post-op. You are responsible for weaning off of pain medication. You can increase the time between narcotic pills, taking one every 4 then 6 then 8 hours and so on.  To seek refills on pain medications during the initial 6-week post-operative period, you must call the office to request the refill. The office will then notify you when to  the prescription. DO NOT wait until you are out of the medication to request a refill. Prescriptions will not be filled over the weekend and depending on the schedule, it may take a couple days for the prescription to be available. Someone will have to pick the prescription in person at the office.    FOLLOW-UP VISITS  You will need to follow up in the office with your surgeon in 4 weeks, or as instructed elsewhere in your discharge paperwork. Please call this number [phone] to schedule this appointment. If you are going to an  SNF/SNU facility, they will arrange for you to follow up in the office.  If you have any concerns or suspected complications prior to your follow up visit, please call the office. Do not wait until your appointment time if you suspect complications. These will need to be addressed in the office promptly.    Dave Carvajal II, MD  Orthopaedic Surgery  [facility]

## 2022-12-10 NOTE — DISCHARGE SUMMARY
Patient Name: Drea Hill  : 1955  MRN: [id number]    Date of Admission: 2022  Date of Discharge:  2022  Primary Care Physician: Lilia Turner APRN      Discharge Diagnoses     Active Hospital Problems    Diagnosis  POA   • **Closed fracture of right hip, initial encounter ([facility]) [S72.001A]  Yes   • Acute blood loss anemia [D62]  No   • Iron deficiency anemia [D50.9]  Yes   • Vitamin B12 deficiency anemia [D51.9]  Yes   • Essential hypertension [I10]  Yes   • GERD without esophagitis [K21.9]  Yes      Resolved Hospital Problems   No resolved problems to display.        Hospital Course     Brief admission history and physical.  Please refer to the H&P for full detail.  A pleasant 67 years old white female with a past history of anemia/hypertension/GERD/generalized osteoarthritis who presented to the emergency department after mechanical fall leading to right hip pain.  No other significant symptomatology.  Physical examination admission included a blood pressure of 133/72 pulse of 68 respiratory rate of 16 and a temperature of 97.6 with an O2 sats of 97% on room air.  Rest of the examination is remarkable for right hip tenderness with decreased range of movement.  Hospital course.  Initial evaluation in the emergency department included a CBC that was normal except a white count of 11.1, hemoglobin 10.1.  CMP was normal except  A blood sugar of 114.  Right hip x-ray revealed right intertrochanteric fracture.  Chest x-ray with no acute disease patient was admitted with mechanical fall leading to intertrochanteric right hip fracture.  Orthopedic was consulted and the patient underwent intramedullary nailing on 2022.  She was placed on aspirin for DVT prophylaxis.  Pain control was achieved by [place].  Physical therapy worked with the patient and the eventual course was discharge home with a walker and home health physical therapy.  She developed slight postoperative fever that was thought  to be atelectasis and this has resolved while on incentive spirometry with no significant leukocytosis.  She does have a history of hypertension blood pressure remained under good control with no evidence of angina or congestive heart failure on lisinopril.  Her GERD has been asymptomatic while on proton pump inhibitor.  She was noted to have an anemia on presentation her baseline hemoglobin was between 8 and 10.  Anemia work-up revealed iron deficiency anemia and vitamin B12 deficiency and anemia.  Her hemoglobin worsened after the operation she received a blood transfusion of a single unit.  She was given IV iron and IM B12.  Intrinsic factor was done and result is pending.  She was placed on p.o. iron and p.o. vitamin B12 at the time of discharge with the need to follow-up on the intrinsic factor as an outpatient.  Her hemoglobin has stabilized after transfusion to 7.7.  She is to follow-up with her primary care physician in 1 week to repeat the CBC.  She is to follow-up with orthopedic in 2 weeks.  Home health with physical therapy we will follow-up with the patient for physical therapy at home.  Walker was provided.  At the time of discharge she was hemodynamically stable  Consultants     Consult Orders (all) (From admission, onward)     Start     Ordered    12/08/22 1919  IV TEAM - Consult for Midline Placement (IV Team to Determine Number of Lumens)  STAT        Provider:  (Not yet assigned)    12/08/22 1919 12/07/22 0000  Inpatient Case Management  Consult  Once        Provider:  (Not yet assigned)    12/06/22 1755 12/06/22 1756  Inpatient Orthopedic Surgery Consult  Once        Specialty:  Orthopedic Surgery  Provider:  Dave Carvajal II, MD    12/06/22 1755 12/06/22 1356  [facility] (on-call MD unless specified) Details  Once,   Status:  Canceled        Specialty:  Hospitalist  Provider:  Jenna Connor MD    12/06/22 1355    12/06/22 1356  Ortho (on-call MD unless  specified)  Once,   Status:  Canceled        Specialty:  Orthopedic Surgery  Provider:  (Not yet assigned)    12/06/22 1355              Procedures     Imaging Results (All)     Procedure Component Value Units Date/Time    XR Hip With or Without Pelvis 2 - 3 View Right [[id number]] Collected: 12/07/22 0856     Updated: 12/07/22 0932    Narrative:      TWO VIEW RIGHT HIP IN THE OR      HISTORY: Internal fixation of intertrochanteric fracture     Imaging in the operating room was performed at the time of internal  fixation of an intertrochanteric fracture with intramedullary neema and  intersecting screws and the final image shows satisfactory alignment. 40  images were obtained and the fluoroscopy time measures 30 seconds.     This report was finalized on 12/7/2022 9:25 AM by Dr. Henry Recio M.D.       XR Hip With or Without Pelvis 1 View Right [[id number]] Collected: 12/07/22 0852     Updated: 12/07/22 0856    Narrative:      ONE VIEW PORTABLE RIGHT HIP AND ONE VIEW PORTABLE AP PELVIS     HISTORY: Internal fixation of fracture     FINDINGS: The postoperative imaging shows internal fixation of a right  intertrochanteric fracture with satisfactory alignment. There is  associated avulsion and slight medial displacement of the lesser  trochanter.     This report was finalized on 12/7/2022 8:52 AM by Dr. Henry Recio M.D.       FL C Arm During Surgery [[id number]] Resulted: 12/07/22 0746     Updated: 12/07/22 0746    Narrative:      This procedure was auto-finalized with no dictation required.    XR Chest 1 View [[id number]] Collected: 12/06/22 1424     Updated: 12/06/22 1428    Narrative:      XR CHEST 1 VW-     HISTORY: Female who is 67 years-old,  preoperative evaluation     TECHNIQUE: Frontal view of the chest     COMPARISON: None available     FINDINGS: Heart size is normal. Aorta is calcified. Pulmonary  vasculature is unremarkable. No focal pulmonary consolidation, pleural  effusion, or pneumothorax. Old  granulomatous disease is apparent. No  acute osseous process.       Impression:      No evidence for acute pulmonary process. Follow-up as  clinical indications persist.     This report was finalized on 12/6/2022 2:25 PM by Dr. Harvinder Rodriguez M.D.       XR Hip With or Without Pelvis 2 - 3 View Right [[id number]] Collected: 12/06/22 1423     Updated: 12/06/22 1427    Narrative:      XR HIP W OR WO PELVIS 2-3 VIEW RIGHT-     INDICATIONS: Trauma     TECHNIQUE: Frontal view the pelvis, 3 views of the right hip     COMPARISON: None available     FINDINGS:     Angulated, comminuted right intertrochanteric fracture is noted. No  other fractures are identified. No dislocation. Degenerative changes  seen at the stenosis pubis and partly included lumbar spine.       Impression:         Right intertrochanteric fracture.     This report was finalized on 12/6/2022 2:24 PM by Dr. Harvinder Rodriguez M.D.             Pertinent Labs     Results from last 7 days   Lab Units 12/09/22  0538 12/08/22  0531 12/07/22  0539 12/06/22  1326   WBC 10*3/mm3 4.87 5.49 6.24 11.16*   HEMOGLOBIN g/dL 7.7* 7.1* 8.1* 10.1*   PLATELETS 10*3/mm3 157 181 231 299     Results from last 7 days   Lab Units 12/09/22  0538 12/08/22  0531 12/07/22  0539 12/06/22  1326   SODIUM mmol/L 135* 134* 133* 136   POTASSIUM mmol/L 3.7 3.8 4.0 3.7   CHLORIDE mmol/L 101 101 97* 101   CO2 mmol/L 25.7 25.4 26.2 23.8   BUN mg/dL 15 12 18 12   CREATININE mg/dL 0.58 0.65 0.63 0.81   GLUCOSE mg/dL 107* 112* 114* 114*   Estimated Creatinine Clearance: 94.5 mL/min (by C-G formula based on SCr of 0.58 mg/dL).  Results from last 7 days   Lab Units 12/06/22  1326   ALBUMIN g/dL 4.50   BILIRUBIN mg/dL 0.3   ALK PHOS U/L 78   AST (SGOT) U/L 14   ALT (SGPT) U/L 11     Results from last 7 days   Lab Units 12/09/22  0538 12/08/22  0531 12/07/22  0539 12/06/22  1326   CALCIUM mg/dL 8.5* 8.3* 8.7 9.2   ALBUMIN g/dL  --   --   --  4.50               Invalid input(s): LDLCALC       Imaging Results (Last 24 Hours)     ** No results found for the last 24 hours. **          Test Results Pending at Discharge     Pending Labs     Order Current Status    Intrinsic Factor Ab In process            Discharge Exam   Physical Exam  Vitals.  Temperature is 98.3 a pulse of 75 respirate rate of 16 and blood pressure 125/69 and O2 sats of 95% on room air  General.  Middle-aged female.  Alert and oriented x3.  In no apparent pain/distress/diaphoresis.  Normal mood and affect.    Eyes.  The pupils equal round and reactive.  Intact extraocular musculature.  No pallor or jaundice.  Oral cavity.  Moist mucous membrane.  Neck.  Supple.  No JVD.  No lymphadenopathy or thyromegaly.  Cardiovascular.  Regular rate and rhythm with grade 2 systolic murmur.  Chest.  Clear to auscultation bilaterally with no added sounds.  Abdomen.  Soft lax.  No tenderness.  No organomegaly.  No guarding or rebound.  Extremities.  Dressed right hip wound laterally.  No lower extremity neuro deficits.  Trace right lower extremity edema  CNS.  No acute focal neurological deficits.  Discharge Details        Discharge Medications      New Medications      Instructions Start Date   acetaminophen 325 MG tablet  Commonly known as: TYLENOL  Replaces: TYLENOL ARTHRITIS PAIN PO   650 mg, Oral, Every 4 Hours PRN      aspirin 81 MG chewable tablet   81 mg, Oral, 2 Times Daily      cyanocobalamin 1000 MCG tablet  Commonly known as: VITAMIN B-12   1,000 mcg, Oral, Daily      HYDROcodone-acetaminophen 7.5-325 MG per tablet  Commonly known as: NORCO   1 tablet, Oral, Every 6 Hours PRN         Continue These Medications      Instructions Start Date   cyclobenzaprine 5 MG tablet  Commonly known as: FLEXERIL   5 mg, Oral, 3 Times Daily PRN      docusate sodium 100 MG capsule   100 mg, Oral, 2 Times Daily      estradiol 10 MCG tablet vaginal tablet  Commonly known as: VAGIFEM   10 mcg, Vaginal      FeroSul 325 (65 FE) MG tablet  Generic drug: ferrous  sulfate   TK 1 T PO D WITH BREAKFAST ON AN EMPTY STOMACH WITH SOMETHING ACIDIC      lisinopril 10 MG tablet  Commonly known as: PRINIVIL,ZESTRIL   Oral, Daily      pantoprazole 40 MG EC tablet  Commonly known as: PROTONIX   40 mg, Oral, Daily      sertraline 100 MG tablet  Commonly known as: ZOLOFT   1 tablet, Oral, Daily      Vitamin D 50 MCG (2000 UT) capsule   1 capsule, Oral         Stop These Medications    TYLENOL ARTHRITIS PAIN PO  Replaced by: acetaminophen 325 MG tablet            Allergies   Allergen Reactions   • Amlodipine Nausea Only     Also shaky and dizzy   • Morphine Nausea And Vomiting         Discharge Disposition:  Condition: Stable    Diet: Healthy cardiac    Activity:   Activity Instructions     Activity as Tolerated      Other Activity Instructions      Activity Instructions: Ambulate with a walker.  Home health physical therapy to evaluate and treat    Up WIth Assist                CODE STATUS:    Code Status and Medical Interventions:   Ordered at: 12/06/22 1755     Code Status (Patient has no pulse and is not breathing):    CPR (Attempt to Resuscitate)     Medical Interventions (Patient has pulse or is breathing):    Full Support       No future appointments.  Additional Instructions for the Follow-ups that You Need to Schedule     Ambulatory Referral to Home Health   As directed      Face to Face Visit Date: 12/9/2022    Follow-up provider for Plan of Care?: I treated the patient in an acute care facility and will not continue treatment after discharge.    Follow-up provider: HILTON HARRISON [[id number]]    Reason/Clinical Findings: Right hip fracture status post open reduction internal fixation    Describe mobility limitations that make leaving home difficult: As above    Nursing/Therapeutic Services Requested: Physical Therapy    PT orders: Therapeutic exercise Gait Training Transfer training Strengthening    Weight Bearing Status: As Tolerated    Frequency: 1 Week 1         Call MD With  Problems / Concerns   As directed      Instructions: Call MD or return to ER if increasing pain in the right hip/lower extremity numbness or weakness/urinary incontinence or inability to void/fever chills/dysuria/nausea or vomiting/chest pain/shortness of breath    Order Comments: Instructions: Call MD or return to ER if increasing pain in the right hip/lower extremity numbness or weakness/urinary incontinence or inability to void/fever chills/dysuria/nausea or vomiting/chest pain/shortness of breath          Discharge Follow-up with PCP   As directed       Currently Documented PCP:    Lilia Turner APRN    PCP Phone Number:    [phone]     Follow Up Details: Primary MD.  1 week.  Iron deficiency anemia/blood loss anemia/B12 deficiency anemia/hypertension/status post right hip fracture open reduction internal fixation/GERD         Discharge Follow-up with Specified Provider: Orthopedic; 2 Weeks   As directed      To: Orthopedic    Follow Up: 2 Weeks    Follow Up Details: Right hip fracture status post open reduction internal fixation            Contact information for follow-up providers     Lilia Turner APRN .    Specialty: Family Medicine  Why: Primary MD.  1 week.  Iron deficiency anemia/blood loss anemia/B12 deficiency anemia/hypertension/status post right hip fracture open reduction internal fixation/GERD  Contact information:  Ara NOLAND [address] [place] [address]  [phone]                   Contact information for after-discharge care     Home Medical Care     [facility] .    Service: Home Health Services  Contact information:  [address] [place] [address]  [facility] [address] [phone]                               Time Spent on Discharge:  Greater than 30 minutes      Robert Smith MD  [facility]  12/09/22  19:06 EST

## 2022-12-11 ENCOUNTER — HOME CARE VISIT (OUTPATIENT)
Dept: HOME HEALTH SERVICES | Facility: HOME HEALTHCARE | Age: 67
End: 2022-12-11

## 2022-12-11 VITALS
RESPIRATION RATE: 18 BRPM | OXYGEN SATURATION: 99 % | DIASTOLIC BLOOD PRESSURE: 70 MMHG | TEMPERATURE: 97 F | SYSTOLIC BLOOD PRESSURE: 142 MMHG | HEART RATE: 91 BPM

## 2022-12-11 PROCEDURE — G0151 HHCP-SERV OF PT,EA 15 MIN: HCPCS

## 2022-12-11 NOTE — HOME HEALTH
66 yo female who suffered a fall and R hip pain/fracture.  Ortho completed IM nailing on 12-8-22. PT focus of care will include post op care, strengthening and gait training. PLOF very independent, active.  Lives with spouse who is in a wheelchair from having polio as a child.    Moderate brusing/swelling to R LE today.  Pic loaded.    Plan for next visit:  pain mgt  gait training  HEP progression

## 2022-12-12 LAB — IF BLOCK AB SER-ACNC: 40.9 AU/ML (ref 0–1.1)

## 2022-12-14 ENCOUNTER — HOME CARE VISIT (OUTPATIENT)
Dept: HOME HEALTH SERVICES | Facility: HOME HEALTHCARE | Age: 67
End: 2022-12-14

## 2022-12-14 VITALS
DIASTOLIC BLOOD PRESSURE: 82 MMHG | OXYGEN SATURATION: 99 % | SYSTOLIC BLOOD PRESSURE: 122 MMHG | RESPIRATION RATE: 18 BRPM | TEMPERATURE: 97.9 F | HEART RATE: 81 BPM

## 2022-12-14 PROCEDURE — G0151 HHCP-SERV OF PT,EA 15 MIN: HCPCS

## 2022-12-14 NOTE — HOME HEALTH
Patient up walking with walker. Patient reports having more pain in mornings but doing better. No falls reported, mild swelling noted right leg. Removed dressing right hip, noticed scant drainage. Applied island dressing with clean technique. Patient tolerated.     ASSESSMENT: Patient tolerated progression of therapeutic exercises in standing with minimal difficulty. Written handout given to patient.    PLAN FOR NEXT VISIT:  --Continue therapeutic exercises  --Continue gait training

## 2022-12-16 ENCOUNTER — HOME CARE VISIT (OUTPATIENT)
Dept: HOME HEALTH SERVICES | Facility: HOME HEALTHCARE | Age: 67
End: 2022-12-16

## 2022-12-20 ENCOUNTER — HOME CARE VISIT (OUTPATIENT)
Dept: HOME HEALTH SERVICES | Facility: HOME HEALTHCARE | Age: 67
End: 2022-12-20

## 2022-12-20 PROCEDURE — G0151 HHCP-SERV OF PT,EA 15 MIN: HCPCS

## 2022-12-21 VITALS
SYSTOLIC BLOOD PRESSURE: 110 MMHG | RESPIRATION RATE: 18 BRPM | DIASTOLIC BLOOD PRESSURE: 58 MMHG | HEART RATE: 96 BPM | OXYGEN SATURATION: 99 % | TEMPERATURE: 98.6 F

## 2022-12-21 NOTE — HOME HEALTH
Patient sitting in recliner upon arrival. Reported feeling like she was doing well and not needing any more physical therapy.

## (undated) DEVICE — SYR LUERLOK 20CC BX/50

## (undated) DEVICE — BNDG ESMARK 4IN 12FT LF STRL BLU

## (undated) DEVICE — APPL CHLORAPREP W/TINT 26ML ORNG

## (undated) DEVICE — GLV SURG SENSICARE PI MIC PF SZ7 LF STRL

## (undated) DEVICE — NEEDLE, QUINCKE, 20GX3.5": Brand: MEDLINE

## (undated) DEVICE — BIT DRL QC DIA 2.5X110MM

## (undated) DEVICE — MAT FLR ABSORBENT LG 4FT 10 2.5FT

## (undated) DEVICE — PK SHLDR OPN 40

## (undated) DEVICE — DRSNG SURESITE WNDW 2.38X2.75

## (undated) DEVICE — DRP C/ARM 41X74IN

## (undated) DEVICE — POOLE SUCTION HANDLE: Brand: CARDINAL HEALTH

## (undated) DEVICE — SUT VIC 0 CT1 36IN J946H

## (undated) DEVICE — 4.0MM LONG AO PILOT DRILL: Brand: TRIGEN

## (undated) DEVICE — INTENT TO BE USED WITH SUTURE MATERIAL FOR TISSUE CLOSURE: Brand: RICHARD-ALLAN® NEEDLE 1/2 CIRCLE TAPER

## (undated) DEVICE — PK HIP PINNING 40

## (undated) DEVICE — GLV SURG BIOGEL LTX PF 8 1/2

## (undated) DEVICE — SOL ISO/ALC RUB 70PCT 4OZ

## (undated) DEVICE — GUIDE PIN 3.2MM X 343MM: Brand: TRIGEN

## (undated) DEVICE — Device

## (undated) DEVICE — DRAPE,U/ SHT,SPLIT,PLAS,STERIL: Brand: MEDLINE

## (undated) DEVICE — DRSNG WND GZ PAD BORDERED 4X8IN STRL

## (undated) DEVICE — DRSNG WND BORDR/ADHS NONADHR/GZ LF 4X4IN STRL

## (undated) DEVICE — HANDPIECE SET WITH COAXIAL HIGH FLOW TIP AND SUCTION TUBE: Brand: INTERPULSE

## (undated) DEVICE — TUBING, SUCTION, 1/4" X 20', STRAIGHT: Brand: MEDLINE INDUSTRIES, INC.

## (undated) DEVICE — DRP C/ARMOR

## (undated) DEVICE — SKIN PREP TRAY W/CHG: Brand: MEDLINE INDUSTRIES, INC.

## (undated) DEVICE — GLV SURG PREMIERPRO ORTHO LTX PF SZ8.5 BRN

## (undated) DEVICE — SUT VIC 2/0 CT2 27IN J269H

## (undated) DEVICE — DRSNG TELFA PAD NONADH STR 1S 3X8IN

## (undated) DEVICE — PAD GRND REM POLYHESIVE A/ DISP

## (undated) DEVICE — STPLR SKIN VISISTAT WD 35CT

## (undated) DEVICE — GLV SURG TRIUMPH CLASSIC PF LTX 8.5 STRL

## (undated) DEVICE — GLV SURG BIOGEL LTX PF 7

## (undated) DEVICE — GLV SURG SIGNATURE ESSENTIAL PF LTX SZ8.5

## (undated) DEVICE — VIOLET BRAIDED (POLYGLACTIN 910), SYNTHETIC ABSORBABLE SUTURE: Brand: COATED VICRYL

## (undated) DEVICE — APPL CHLORAPREP HI/LITE 26ML ORNG

## (undated) DEVICE — OPTIFOAM GENTLE SA, POSTOP, 4X8: Brand: MEDLINE

## (undated) DEVICE — INTERTAN LAG SCREW DRILL: Brand: TRIGEN

## (undated) DEVICE — ADHS SKIN DERMABOND TOP ADVANCED

## (undated) DEVICE — DRSNG SURESITE123 4X10IN

## (undated) DEVICE — PREP SOL POVIDONE/IODINE BT 4OZ

## (undated) DEVICE — BNDG ELAS CO-FLEX SLF ADHR 6IN 5YD LF STRL